# Patient Record
Sex: FEMALE | Race: WHITE | NOT HISPANIC OR LATINO | Employment: OTHER | URBAN - METROPOLITAN AREA
[De-identification: names, ages, dates, MRNs, and addresses within clinical notes are randomized per-mention and may not be internally consistent; named-entity substitution may affect disease eponyms.]

---

## 2017-04-12 ENCOUNTER — OFFICE VISIT - RIVER FALLS (OUTPATIENT)
Dept: FAMILY MEDICINE | Facility: CLINIC | Age: 62
End: 2017-04-12

## 2017-04-12 ASSESSMENT — MIFFLIN-ST. JEOR: SCORE: 1054.78

## 2017-11-14 ENCOUNTER — OFFICE VISIT - RIVER FALLS (OUTPATIENT)
Dept: FAMILY MEDICINE | Facility: CLINIC | Age: 62
End: 2017-11-14

## 2017-11-14 ASSESSMENT — MIFFLIN-ST. JEOR: SCORE: 1091.07

## 2017-11-18 ENCOUNTER — AMBULATORY - RIVER FALLS (OUTPATIENT)
Dept: FAMILY MEDICINE | Facility: CLINIC | Age: 62
End: 2017-11-18

## 2017-11-19 LAB
CHOLEST SERPL-MCNC: 232 MG/DL
CHOLEST/HDLC SERPL: 3.9 {RATIO}
CREAT SERPL-MCNC: 0.87 MG/DL (ref 0.5–0.99)
GLUCOSE BLD-MCNC: 87 MG/DL (ref 65–99)
HDLC SERPL-MCNC: 59 MG/DL
LDLC SERPL CALC-MCNC: 154 MG/DL
NONHDLC SERPL-MCNC: 173 MG/DL
TRIGL SERPL-MCNC: 84 MG/DL

## 2017-11-21 ENCOUNTER — OFFICE VISIT - RIVER FALLS (OUTPATIENT)
Dept: FAMILY MEDICINE | Facility: CLINIC | Age: 62
End: 2017-11-21

## 2017-11-21 ENCOUNTER — AMBULATORY - RIVER FALLS (OUTPATIENT)
Dept: FAMILY MEDICINE | Facility: CLINIC | Age: 62
End: 2017-11-21

## 2017-11-21 ASSESSMENT — MIFFLIN-ST. JEOR: SCORE: 1085.63

## 2018-11-16 ENCOUNTER — OFFICE VISIT - RIVER FALLS (OUTPATIENT)
Dept: FAMILY MEDICINE | Facility: CLINIC | Age: 63
End: 2018-11-16

## 2018-11-16 ASSESSMENT — MIFFLIN-ST. JEOR: SCORE: 1054.79

## 2018-11-20 LAB
CHOLEST SERPL-MCNC: 262 MG/DL
CHOLEST/HDLC SERPL: 3.6 {RATIO}
CREAT SERPL-MCNC: 0.73 MG/DL (ref 0.5–0.99)
GLUCOSE BLD-MCNC: 85 MG/DL (ref 65–99)
HBV SURFACE AB SERPL IA-ACNC: NORMAL M[IU]/ML
HDLC SERPL-MCNC: 73 MG/DL
LDLC SERPL CALC-MCNC: 170 MG/DL
NONHDLC SERPL-MCNC: 189 MG/DL
TRIGL SERPL-MCNC: 83 MG/DL

## 2018-11-27 ENCOUNTER — OFFICE VISIT - RIVER FALLS (OUTPATIENT)
Dept: FAMILY MEDICINE | Facility: CLINIC | Age: 63
End: 2018-11-27

## 2018-12-11 ENCOUNTER — OFFICE VISIT - RIVER FALLS (OUTPATIENT)
Dept: FAMILY MEDICINE | Facility: CLINIC | Age: 63
End: 2018-12-11

## 2018-12-18 ENCOUNTER — OFFICE VISIT - RIVER FALLS (OUTPATIENT)
Dept: FAMILY MEDICINE | Facility: CLINIC | Age: 63
End: 2018-12-18

## 2019-01-03 ENCOUNTER — AMBULATORY - RIVER FALLS (OUTPATIENT)
Dept: FAMILY MEDICINE | Facility: CLINIC | Age: 64
End: 2019-01-03

## 2019-01-24 ENCOUNTER — AMBULATORY - RIVER FALLS (OUTPATIENT)
Dept: FAMILY MEDICINE | Facility: CLINIC | Age: 64
End: 2019-01-24

## 2019-01-25 LAB
BUN SERPL-MCNC: 22 MG/DL (ref 7–25)
BUN/CREAT RATIO - HISTORICAL: NORMAL (ref 6–22)
CALCIUM SERPL-MCNC: 9.8 MG/DL (ref 8.6–10.4)
CHLORIDE BLD-SCNC: 103 MMOL/L (ref 98–110)
CO2 SERPL-SCNC: 25 MMOL/L (ref 20–32)
CREAT SERPL-MCNC: 0.98 MG/DL (ref 0.5–0.99)
EGFRCR SERPLBLD CKD-EPI 2021: 61 ML/MIN/1.73M2
GLUCOSE BLD-MCNC: 84 MG/DL (ref 65–99)
POTASSIUM BLD-SCNC: 4.2 MMOL/L (ref 3.5–5.3)
SODIUM SERPL-SCNC: 137 MMOL/L (ref 135–146)

## 2019-12-10 ENCOUNTER — AMBULATORY - RIVER FALLS (OUTPATIENT)
Dept: FAMILY MEDICINE | Facility: CLINIC | Age: 64
End: 2019-12-10

## 2019-12-11 LAB
BUN SERPL-MCNC: 25 MG/DL (ref 7–25)
BUN/CREAT RATIO - HISTORICAL: NORMAL (ref 6–22)
CALCIUM SERPL-MCNC: 10 MG/DL (ref 8.6–10.4)
CHLORIDE BLD-SCNC: 106 MMOL/L (ref 98–110)
CHOLEST SERPL-MCNC: 240 MG/DL
CHOLEST/HDLC SERPL: 4.1 {RATIO}
CO2 SERPL-SCNC: 26 MMOL/L (ref 20–32)
CREAT SERPL-MCNC: 0.81 MG/DL (ref 0.5–0.99)
EGFRCR SERPLBLD CKD-EPI 2021: 76 ML/MIN/1.73M2
GLUCOSE BLD-MCNC: 98 MG/DL (ref 65–99)
HBA1C MFR BLD: 5.7 %
HDLC SERPL-MCNC: 59 MG/DL
LDLC SERPL CALC-MCNC: 164 MG/DL
NONHDLC SERPL-MCNC: 181 MG/DL
POTASSIUM BLD-SCNC: 4.3 MMOL/L (ref 3.5–5.3)
SODIUM SERPL-SCNC: 139 MMOL/L (ref 135–146)
TRIGL SERPL-MCNC: 71 MG/DL

## 2019-12-19 ENCOUNTER — OFFICE VISIT - RIVER FALLS (OUTPATIENT)
Dept: FAMILY MEDICINE | Facility: CLINIC | Age: 64
End: 2019-12-19

## 2019-12-19 ASSESSMENT — MIFFLIN-ST. JEOR: SCORE: 1069.52

## 2019-12-21 ENCOUNTER — NURSE TRIAGE (OUTPATIENT)
Dept: NURSING | Facility: CLINIC | Age: 64
End: 2019-12-21

## 2019-12-21 ENCOUNTER — OFFICE VISIT - RIVER FALLS (OUTPATIENT)
Dept: FAMILY MEDICINE | Facility: CLINIC | Age: 64
End: 2019-12-21

## 2019-12-21 NOTE — TELEPHONE ENCOUNTER
"\"My arm is really really sore.\" Patient reporting she received the pneumonia vaccine 2 days ago in her right arm. Patient reporting increased tenderness today. Right arm is \"swollen and red.\" Redness from above elbow to shoulder. Afebrile. Advised to be seen with in 24 hours. Caller verbalized understanding. Denies further questions.    Reny Hernandez RN  [facility]      Reason for Disposition    [1] Redness or red streak around the injection site AND [2] begins > 48 hours after shot AND [3] no fever  (Exception: red area < 1 inch or 2.5 cm wide)    Additional Information    Negative: [1] Difficulty with breathing or swallowing AND [2] starts within 2 hours after injection    Negative: Difficult to awaken or acting confused (e.g., disoriented, slurred speech)    Negative: Unresponsive, passed out, or very weak    Negative: Sounds like a life-threatening emergency to the triager    Negative: Fever > 104 F (40 C)    Negative: [1] Fever > 101 F (38.3 C) AND [2] age > 60    Negative: [1] Fever > 100.0 F (37.8 C) AND [2] bedridden (e.g., nursing home patient, CVA, chronic illness, recovering from surgery)    Negative: [1] Fever > 100.0 F (37.8 C) AND [2] diabetes mellitus or weak immune system (e.g., HIV positive, cancer chemo, splenectomy, chronic steroids)    Negative: [1] Measles vaccine rash (onset day 6-12) AND [2] purple or blood-colored    Negative: Sounds like a severe, unusual reaction to the triager    Negative: [1] Redness or red streak around the injection site AND [2] begins > 48 hours after shot AND [3] fever    Protocols used: IMMUNIZATION [id number]      "

## 2020-01-08 ENCOUNTER — OFFICE VISIT - RIVER FALLS (OUTPATIENT)
Dept: FAMILY MEDICINE | Facility: CLINIC | Age: 65
End: 2020-01-08

## 2020-11-17 ENCOUNTER — OFFICE VISIT - RIVER FALLS (OUTPATIENT)
Dept: FAMILY MEDICINE | Facility: CLINIC | Age: 65
End: 2020-11-17

## 2020-11-18 LAB
BUN SERPL-MCNC: 26 MG/DL (ref 7–25)
BUN/CREAT RATIO - HISTORICAL: 29 (ref 6–22)
CALCIUM SERPL-MCNC: 10.4 MG/DL (ref 8.6–10.4)
CHLORIDE BLD-SCNC: 104 MMOL/L (ref 98–110)
CHOLEST SERPL-MCNC: 264 MG/DL
CHOLEST/HDLC SERPL: 3.8 {RATIO}
CO2 SERPL-SCNC: 28 MMOL/L (ref 20–32)
CREAT SERPL-MCNC: 0.89 MG/DL (ref 0.5–0.99)
EGFRCR SERPLBLD CKD-EPI 2021: 67 ML/MIN/1.73M2
GLUCOSE BLD-MCNC: 99 MG/DL (ref 65–99)
HBA1C MFR BLD: 5.6 %
HDLC SERPL-MCNC: 69 MG/DL
LDLC SERPL CALC-MCNC: 174 MG/DL
NONHDLC SERPL-MCNC: 195 MG/DL
POTASSIUM BLD-SCNC: 5.1 MMOL/L (ref 3.5–5.3)
SODIUM SERPL-SCNC: 139 MMOL/L (ref 135–146)
TRIGL SERPL-MCNC: 92 MG/DL

## 2020-12-02 ENCOUNTER — COMMUNICATION - RIVER FALLS (OUTPATIENT)
Dept: FAMILY MEDICINE | Facility: CLINIC | Age: 65
End: 2020-12-02

## 2021-01-01 ENCOUNTER — AMBULATORY - RIVER FALLS (OUTPATIENT)
Dept: FAMILY MEDICINE | Facility: CLINIC | Age: 66
End: 2021-01-01

## 2021-02-10 ENCOUNTER — AMBULATORY - RIVER FALLS (OUTPATIENT)
Dept: FAMILY MEDICINE | Facility: CLINIC | Age: 66
End: 2021-02-10

## 2021-02-11 LAB — FECAL FAT, QUALITATIVE: NORMAL

## 2021-02-12 ENCOUNTER — COMMUNICATION - RIVER FALLS (OUTPATIENT)
Dept: FAMILY MEDICINE | Facility: CLINIC | Age: 66
End: 2021-02-12

## 2021-03-03 ENCOUNTER — AMBULATORY - RIVER FALLS (OUTPATIENT)
Dept: FAMILY MEDICINE | Facility: CLINIC | Age: 66
End: 2021-03-03

## 2021-03-04 LAB — FECAL FAT, QUALITATIVE: NORMAL

## 2021-03-25 ENCOUNTER — COMMUNICATION - RIVER FALLS (OUTPATIENT)
Dept: FAMILY MEDICINE | Facility: CLINIC | Age: 66
End: 2021-03-25

## 2021-11-30 ENCOUNTER — OFFICE VISIT - RIVER FALLS (OUTPATIENT)
Dept: FAMILY MEDICINE | Facility: CLINIC | Age: 66
End: 2021-11-30

## 2021-11-30 ENCOUNTER — TRANSFERRED RECORDS (OUTPATIENT)
Dept: MULTI SPECIALTY CLINIC | Facility: CLINIC | Age: 66
End: 2021-11-30

## 2021-11-30 ASSESSMENT — MIFFLIN-ST. JEOR: SCORE: 1056.26

## 2021-12-01 ENCOUNTER — COMMUNICATION - RIVER FALLS (OUTPATIENT)
Dept: FAMILY MEDICINE | Facility: CLINIC | Age: 66
End: 2021-12-01

## 2021-12-01 LAB
A/G RATIO - HISTORICAL: 1.8 (ref 1–2.5)
ALBUMIN SERPL-MCNC: 4.6 GM/DL (ref 3.6–5.1)
ALP SERPL-CCNC: 71 UNIT/L (ref 37–153)
ALT SERPL W P-5'-P-CCNC: 27 UNIT/L (ref 6–29)
AST SERPL W P-5'-P-CCNC: 21 UNIT/L (ref 10–35)
BILIRUB SERPL-MCNC: 0.4 MG/DL (ref 0.2–1.2)
BUN SERPL-MCNC: 25 MG/DL (ref 7–25)
BUN/CREAT RATIO - HISTORICAL: NORMAL (ref 6–22)
CALCIUM SERPL-MCNC: 10.1 MG/DL (ref 8.6–10.4)
CHLORIDE BLD-SCNC: 105 MMOL/L (ref 98–110)
CHOLEST SERPL-MCNC: 251 MG/DL
CHOLEST/HDLC SERPL: 3.4 {RATIO}
CO2 SERPL-SCNC: 27 MMOL/L (ref 20–32)
CREAT SERPL-MCNC: 0.77 MG/DL (ref 0.5–0.99)
EGFRCR SERPLBLD CKD-EPI 2021: 79 ML/MIN/1.73M2
GLOBULIN: 2.5 (ref 1.9–3.7)
GLUCOSE BLD-MCNC: 96 MG/DL (ref 65–99)
HBA1C MFR BLD: 5.8 %
HDLC SERPL-MCNC: 73 MG/DL
LDLC SERPL CALC-MCNC: 162 MG/DL
NONHDLC SERPL-MCNC: 178 MG/DL
POTASSIUM BLD-SCNC: 4.6 MMOL/L (ref 3.5–5.3)
PROT SERPL-MCNC: 7.1 GM/DL (ref 6.1–8.1)
SODIUM SERPL-SCNC: 139 MMOL/L (ref 135–146)
TRIGL SERPL-MCNC: 66 MG/DL

## 2021-12-20 ENCOUNTER — COMMUNICATION - RIVER FALLS (OUTPATIENT)
Dept: FAMILY MEDICINE | Facility: CLINIC | Age: 66
End: 2021-12-20

## 2022-02-10 ENCOUNTER — OFFICE VISIT - RIVER FALLS (OUTPATIENT)
Dept: FAMILY MEDICINE | Facility: CLINIC | Age: 67
End: 2022-02-10
Payer: COMMERCIAL

## 2022-02-11 VITALS
WEIGHT: 133 LBS | HEIGHT: 60 IN | OXYGEN SATURATION: 98 % | HEART RATE: 69 BPM | TEMPERATURE: 98.2 F | SYSTOLIC BLOOD PRESSURE: 136 MMHG | BODY MASS INDEX: 26.11 KG/M2 | DIASTOLIC BLOOD PRESSURE: 76 MMHG

## 2022-02-11 VITALS
SYSTOLIC BLOOD PRESSURE: 148 MMHG | WEIGHT: 141 LBS | HEART RATE: 86 BPM | HEIGHT: 60 IN | DIASTOLIC BLOOD PRESSURE: 77 MMHG | BODY MASS INDEX: 27.45 KG/M2 | WEIGHT: 139.8 LBS | SYSTOLIC BLOOD PRESSURE: 132 MMHG | HEIGHT: 60 IN | BODY MASS INDEX: 27.68 KG/M2 | DIASTOLIC BLOOD PRESSURE: 80 MMHG | HEART RATE: 80 BPM | TEMPERATURE: 97.5 F

## 2022-02-11 VITALS
SYSTOLIC BLOOD PRESSURE: 110 MMHG | HEART RATE: 74 BPM | TEMPERATURE: 97.9 F | DIASTOLIC BLOOD PRESSURE: 72 MMHG | WEIGHT: 139 LBS | BODY MASS INDEX: 27.6 KG/M2 | OXYGEN SATURATION: 98 %

## 2022-02-11 VITALS
TEMPERATURE: 97.4 F | DIASTOLIC BLOOD PRESSURE: 60 MMHG | TEMPERATURE: 97.9 F | HEIGHT: 60 IN | SYSTOLIC BLOOD PRESSURE: 110 MMHG | BODY MASS INDEX: 27.09 KG/M2 | HEIGHT: 60 IN | HEART RATE: 81 BPM | HEART RATE: 98 BPM | SYSTOLIC BLOOD PRESSURE: 108 MMHG | WEIGHT: 138 LBS | DIASTOLIC BLOOD PRESSURE: 60 MMHG | SYSTOLIC BLOOD PRESSURE: 102 MMHG | DIASTOLIC BLOOD PRESSURE: 64 MMHG | HEART RATE: 74 BPM | WEIGHT: 138.8 LBS | OXYGEN SATURATION: 99 % | TEMPERATURE: 97 F | OXYGEN SATURATION: 99 % | OXYGEN SATURATION: 98 % | BODY MASS INDEX: 27.57 KG/M2 | BODY MASS INDEX: 27.41 KG/M2

## 2022-02-11 VITALS
BODY MASS INDEX: 29.39 KG/M2 | OXYGEN SATURATION: 98 % | HEART RATE: 86 BPM | TEMPERATURE: 97.3 F | SYSTOLIC BLOOD PRESSURE: 152 MMHG | DIASTOLIC BLOOD PRESSURE: 96 MMHG | HEIGHT: 58 IN | WEIGHT: 140 LBS

## 2022-02-11 VITALS
HEART RATE: 90 BPM | DIASTOLIC BLOOD PRESSURE: 70 MMHG | BODY MASS INDEX: 27.46 KG/M2 | WEIGHT: 140.6 LBS | OXYGEN SATURATION: 99 % | DIASTOLIC BLOOD PRESSURE: 66 MMHG | SYSTOLIC BLOOD PRESSURE: 140 MMHG | TEMPERATURE: 97.9 F | SYSTOLIC BLOOD PRESSURE: 110 MMHG | HEART RATE: 76 BPM

## 2022-02-11 VITALS
BODY MASS INDEX: 26.35 KG/M2 | OXYGEN SATURATION: 98 % | RESPIRATION RATE: 16 BRPM | SYSTOLIC BLOOD PRESSURE: 104 MMHG | HEIGHT: 60 IN | WEIGHT: 134.2 LBS | HEART RATE: 68 BPM | DIASTOLIC BLOOD PRESSURE: 60 MMHG

## 2022-02-16 NOTE — NURSING NOTE
Hearing Screening Entered On:  11/17/2020 11:14 AM CST    Performed On:  11/17/2020 11:13 AM CST by Tiffany Vasquez CMA               Additional Hearing Screen   Hearing Screen Comments :   Bilateral pass @ 4000Hz   Tiffany Vasquez CMA - 11/17/2020 11:13 AM CST

## 2022-02-16 NOTE — TELEPHONE ENCOUNTER
** Not Approved: Request responded to by other means, Sent to a mail order pharmacy **  citalopram (CITALOPRAM HBR 20 MG TABLET)  TAKE 1 TABLET BY MOUTH EVERY DAY  Qty:  30 tab(s)        Days Supply:  30        Refills:  0          Substitutions Allowed     [id number]   Signed by Rosie Robles CMA            ------------------------------------------  From: [place] [id number] [facility]  To: Jocelyn Lebron MD  Sent: May 10, 2019 3:38:01 AM CDT  Subject: Medication Management  Due: May 11, 2019 3:38:01 AM CDT    ** On Hold Pending Signature **  Drug: citalopram (citalopram 20 mg oral tablet)  TAKE 1 TABLET BY MOUTH EVERY DAY  Quantity: 30 tab(s)     Days Supply: 30        Refills: 0  Substitutions Allowed  Notes from Pharmacy:     Dispensed Drug: citalopram (citalopram 20 mg oral tablet)  TAKE 1 TABLET BY MOUTH EVERY DAY  Quantity: 30 tab(s)     Days Supply: 30        Refills: 0  Substitutions Allowed  Notes from Pharmacy:     ** On Hold Pending Signature **  Drug: citalopram (citalopram 20 mg oral tablet)  TAKE 1 TABLET BY MOUTH EVERY DAY  Quantity: 30 tab(s)     Days Supply: 30        Refills: 0  Substitutions Allowed  Notes from Pharmacy:     Dispensed Drug: citalopram (citalopram 20 mg oral tablet)  TAKE 1 TABLET BY MOUTH EVERY DAY  Quantity: 30 tab(s)     Days Supply: 30        Refills: 0  Substitutions Allowed  Notes from Pharmacy:   ------------------------------------------

## 2022-02-16 NOTE — PROGRESS NOTES
Chief Complaint    Patient is here for reaction to pneumonia vaccine. Has shot on Thursday. c/o tenderness, swelling, redness in right arm.  History of Present Illness      Patient presents to the clinic with a 48-hour history of increased pain redness and swelling at the site of her pneumococcal 23 vaccine on the right upper extremity.  She denies any fevers or chills.  She has had mild pain but the pain is improving as the redness is worsening.  Review of Systems      Review of systems is negative with the exception of those noted in HPI          Physical Exam   Vitals & Measurements    T: 97.9   F (Tympanic)  HR: 98(Peripheral)  BP: 108/64  SpO2: 98%     HT: 59.5 in       Examination of the right upper extremity reveals erythema slight induration and mild tenderness to palpation of the right deltoid muscle.  Peripheral pulses are intact cap refill is brisk.  Assessment/Plan       1. Local reaction to immunization (T88.1XXA)         conservative measures, cool compresses, iburpofen or tylenol relative rest.  She will not likely need another pneumonia vaccine but discussed this is not a true allergy but a possible expected reaction  FU for persistent or worsening sx.  Not likely infection as sx started in the first 48 hours of the immunization.  Patient Information     Name:TERRY FORRESTER      Address:      [address] [id number]     Sex:Female     YOB: 1955     [phone]     Emergency Contact:LASHAE FORRESTER     MRN:[id number]     FIN:[id number]     [email or web address]     Date of Service:12/21/2019      Primary Care Physician:       Jocelyn Lebron MD, [phone]      Attending Physician:       Meghna Chin PA-C, [phone]  Problem List/Past Medical History    Ongoing     Diverticulosis     Ex-smoker     Hyperlipidemia     Menopause     Osteopenia     Third degree hemorrhoids    Historical     *Hospitalized@[facility]  prolapse     AA (alcohol abuse)     Glaucoma     Lyme disease     Pregnancy     Pregnancy     Pregnancy     Pregnancy  Procedure/Surgical History     Mesh rectopexy (2016)            Comments: Rigid proctoscopy..     THD - Transanal hemorroidal dearterialization (2016)           Cardiac computed tomography for calcium scoring (2015)            Comments: Total Agatston calcium score is 5.     Colonoscopy (2011)            Comments: Normal; repeat 2021.     Sigmoidoscopy (2005)           Laser surgery ()            Comments: on R eye for Glaucoma.     Therapeutic  ()            section ()        Medications    Aleve 220 mg oral tablet, 220 mg= 1 tab(s), Oral, daily    Calcium 600+D, 1 tab(s), Oral, daily    citalopram 20 mg oral tablet, 20 mg= 1 tab(s), Oral, daily, 2 refills    lisinopril 10 mg oral tablet, See Instructions, 11 refills    lisinopril 10 mg oral tablet, 10 mg= 1 tab(s), Oral, daily, 1 refills    loratadine, Oral, prn    MiraLax oral powder for reconstitution, 17 gm, Oral, daily    triamcinolone 0.1% topical cream, 1 kurtis, Topical, bid, 2 refills  Allergies    Pneumovax 23 (Localized swelling)  Social History    Smoking Status - 2019     Never smoker     Alcohol      Past, Recovering alcoholic, 2017     Employment/School      Retired, 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 Highest education level: University degree(s)., 2018     Exercise     Home/Environment      Marital status: . Spouse/Partner name: Desi Barajas children. Risks in environment: owns secured gun., 2017     Nutrition/Health      Type of diet: Regular., 10/04/2016     Sexual      Sexually active: Yes. Sexual orientation: Heterosexual., 2012     Substance Abuse      Past, Marijuana, 2018     Tobacco      Former smoker, quit more than 30 days ago, Cigarettes, 2018  Family History    Allergic Rhinitis: Mother.     Arthritis: Mother.    Breast cancer: Mother (Dx at 75) and Cousin (M).    Crohn disease: Brother.    Depression: Mother.    Hypercholesterolemia: Mother.    Prostate cancer.: Father.    Sister: History is negative    Daughter: History is negative    Son: History is negative    Son: History is negative  Immunizations      Vaccine Date Status          pneumococcal (PPSV23) 12/19/2019 Given          influenza virus vaccine, inactivated 12/19/2019 Given          pneumococcal (PCV13) 11/16/2018 Given          influenza virus vaccine, inactivated 11/16/2018 Given          influenza 11/16/2018 Recorded          influenza virus vaccine, inactivated 11/14/2017 Given          influenza virus vaccine, inactivated 10/04/2016 Given          influenza virus vaccine, inactivated 08/31/2015 Given          tetanus/diphth/pertuss (Tdap) adult/adol 08/18/2014 Given          influenza virus vaccine, inactivated 12/01/2010 Given          influenza, H1N1, inactivated 12/15/2009 Recorded          Td 04/04/2003 Recorded          Td 03/10/1993 Recorded          Td 04/01/1981 Recorded  Lab Results       Lab Results (Last 4 results within 90 days)        Sodium Level: 139 mmol/L [135 mmol/L - 146 mmol/L] (12/10/19 09:33:00)       Potassium Level: 4.3 mmol/L [3.5 mmol/L - 5.3 mmol/L] (12/10/19 09:33:00)       Chloride Level: 106 mmol/L [98 mmol/L - 110 mmol/L] (12/10/19 09:33:00)       CO2 Level: 26 mmol/L [20 mmol/L - 32 mmol/L] (12/10/19 09:33:00)       Glucose Level: 98 mg/dL [65 mg/dL - 99 mg/dL] (12/10/19 09:33:00)       BUN: 25 mg/dL [7 mg/dL - 25 mg/dL] (12/10/19 09:33:00)       Creatinine Level: 0.81 mg/dL [0.5 mg/dL - 0.99 mg/dL] (12/10/19 09:33:00)       BUN/Creat Ratio: NOT APPLICABLE [6  - 22] (12/10/19 09:33:00)       eGFR: 76 mL/min/1.73m2 (12/10/19 09:33:00)       eGFR : 88 mL/min/1.73m2 (12/10/19 09:33:00)       Calcium Level: 10 mg/dL [8.6 mg/dL - 10.4 mg/dL] (12/10/19 09:33:00)       Hgb A1c: 5.7 High  (12/10/19 09:33:00)       Cholesterol: 240 mg/dL High (12/10/19 09:33:00)       Non-HDL Cholesterol: 181 High (12/10/19 09:33:00)       HDL: 59 mg/dL (12/10/19 09:33:00)       Cholesterol/HDL Ratio: 4.1 (12/10/19 09:33:00)       LDL: 164 High (12/10/19 09:33:00)       Triglyceride: 71 mg/dL (12/10/19 09:33:00)

## 2022-02-16 NOTE — TELEPHONE ENCOUNTER
---------------------  From: Jocelyn Lebron MD   To: [facility] Message Pool ([id number]_[place]);     Sent: 11/30/2021 10:07:11 AM CST  Subject: fax     [facility]xa [facility] plsFaxed to [facility] [phone]

## 2022-02-16 NOTE — TELEPHONE ENCOUNTER
---------------------  From: Tory Veras LPN (eRx Pool ([address]_[facility]))   To: Advanced Practice Provider Pool ([address] [place] [address]);     Sent: 5/15/2020 8:22:48 AM CDT  Subject: FW: Medication Management   Due Date/Time: 5/15/2020 10:14:00 AM CDT     MJP out of clinic  Medication Refill needing approval    PCP:   ARIADNA    Medication:   pimecrolimus 1% topical cream  Last Filled:  1/8/20    Quantity:  30gm  Refills:  0  CSA on file?   n/a     Date of last office visit and reason:   1/8/20 with MJP  Date of last labs pertaining to condition:  n/a    Note:  Please advise on refill.     Return to Clinic order placed?  yes- pt due for AWV in Dec 2020    Resource:   pharmacy        ------------------------------------------  [id number]  To: Jocelyn Lebron MD  Sent: May 14, 2020 10:14:37 AM CDT  Subject: Medication Management  Due: May 15, 2020 10:14:37 AM CDT    ** On Hold Pending Signature **  Drug: pimecrolimus topical (pimecrolimus 1% topical cream)  APPLY TO AFFECTED AREA TWICE A DAY  Quantity: 30 gm  Days Supply: 30  Refills: 0  Substitutions Allowed  Notes from Pharmacy:     Dispensed Drug: pimecrolimus topical (pimecrolimus 1% topical cream)  APPLY TO AFFECTED AREA TWICE A DAY  Quantity: 30 gm  Days Supply: 30  Refills: 0  Substitutions Allowed  Notes from Pharmacy:   ---------------------------------------------------------------  From: Jud Dallas (Advanced Practice Provider Pool ([address]_[facility]))   To: eRx Pool ([address]_[place]);     Sent: 5/15/2020 8:35:40 AM CDT  Subject: RE: Medication Management     I will send refill---------------------  From: Jud Dallas   To: [facility] [id number] [facility]    Sent: 5/15/2020 8:35:49 AM CDT  Subject: FW: Medication Management     ** Submitted: **  Complete:pimecrolimus topical (pimecrolimus 1% topical cream)   Signed by Jud Dallas  5/15/2020 1:35:00 PM    ** Approved **  pimecrolimus topical (PIMECROLIMUS 1% CREAM)   APPLY TO AFFECTED AREA TWICE A DAY  Qty:  30 gm        Days Supply:  30        Refills:  0          Substitutions Allowed     [id number]   Signed by Jud Dallas

## 2022-02-16 NOTE — LETTER
(Inserted Image. Unable to display)   December 30, 2021  TERRY FORRESTER   [address] [place] [address]        Dear TERRY,    Thank you for selecting [facility] for your healthcare needs.    Our records indicate you are due for the following services:     Colon Cancer Screening Stool Cards ~ Stool cards were sent home with you within the past 3 months and have not been returned to us for testing. You may either drop off your stool cards at your clinic, or send them to us in the mail.     (FYI   Regarding office visits: In some instances, a video visit or telephone visit may be offered as an option.)    To schedule an appointment or if you have further questions, please contact your clinic at [phone].    Powered by [email or web address]    Sincerely,    Jocelyn Lebron MD

## 2022-02-16 NOTE — TELEPHONE ENCOUNTER
---------------------  From: Kristi SHELTON, Kareen (Phone Messages Pool ([address]_[facility]))   To: Phone Messages Pool ([address]_[place]);     Sent: 10/29/2020 10:09:19 AM CDT  Subject: Citalopram refill     Phone Message    PCP:   ARIADNA      Time of Call:  0935       Person Calling:  Lolita from [facility]  Phone number:  [phone]    Reason for call:  [facility] asking for refill of citalopram 20mg  Returned call at: _    Note:   called/LMTCB as med was refilled 10/21/2020 and sent to [facility] [place].  Has AWV 11/4/2020 w/ [facility].  Need to verify what pharmacy med is to go to.    Last office visit and reason:  _    Transferred to: _Per patient request in previous message, she wanted this sent to [facility] lvm verifying she does not need med sent to [facility]. Appt made for refills

## 2022-02-16 NOTE — NURSING NOTE
Medicare Visit Entered On:  12/19/2019 9:13 AM CST    Performed On:  12/19/2019 9:05 AM CST by Maryanne Lima CMA               Summary   Chief Complaint :   AWV   Menstrual Status :   Postmenopausal   Weight Measured :   138.0 lb(Converted to: 138 lb 0 oz, 62.60 kg)    Height Measured :   59.5 in(Converted to: 4 ft 11 in, 151.13 cm)    Body Mass Index :   27.4 kg/m2 (HI)    Body Surface Area :   1.62 m2   Systolic Blood Pressure :   110 mmHg   Diastolic Blood Pressure :   60 mmHg   Mean Arterial Pressure :   77 mmHg   Peripheral Pulse Rate :   74 bpm   BP Site :   Right arm   BP Method :   Manual   HR Method :   Electronic   Temperature Tympanic :   97.4 DegF(Converted to: 36.3 DegC)  (LOW)    Oxygen Saturation :   99 %   Maryanne Lima CMA - 12/19/2019 9:05 AM CST   Health Status   Allergies Verified? :   Yes   Medication History Verified? :   Yes   Pre-Visit Planning Status :   Completed   Tobacco Use? :   Never smoker   Maryanne Lima CMA - 12/19/2019 9:05 AM CST   Consents   Consent for Immunization Exchange :   Consent Granted   Consent for Immunizations to Providers :   Consent Granted   Maryanne Lima CMA - 12/19/2019 9:05 AM CST   Meds / Allergies   (As Of: 12/19/2019 9:13:23 AM CST)   Allergies (Active)   No Known Medication Allergies  Estimated Onset Date:   Unspecified ; Created By:   Jose Elias Garcia CMA; Reaction Status:   Active ; Category:   Drug ; Substance:   No Known Medication Allergies ; Type:   Allergy ; Updated By:   Jose Elias Garcia CMA; Reviewed Date:   11/21/2017 8:42 AM CST        Medication List   (As Of: 12/19/2019 9:13:23 AM CST)   Prescription/Discharge Order    citalopram  :   citalopram ; Status:   Prescribed ; Ordered As Mnemonic:   citalopram 20 mg oral tablet ; Simple Display Line:   20 mg, 1 tab(s), PO, Daily, 90 tab(s), 2 Refill(s) ; Ordering Provider:   Jocelyn Lebron MD; Catalog Code:   citalopram ; Order Dt/Tm:   3/11/2019 3:33:14 PM CDT          lisinopril 10 mg oral tablet  :    lisinopril 10 mg oral tablet ; Status:   Prescribed ; Ordered As Mnemonic:   lisinopril 10 mg oral tablet ; Simple Display Line:   See Instructions, TAKE 1 TABLET BY MOUTH EVERY DAY, 30 tab(s), 11 Refill(s) ; Ordering Provider:   Jocelyn Lebron MD; Catalog Code:   lisinopril ; Order Dt/Tm:   2/12/2019 7:03:09 PM CST          lisinopril  :   lisinopril ; Status:   Prescribed ; Ordered As Mnemonic:   lisinopril 10 mg oral tablet ; Simple Display Line:   10 mg, 1 tab(s), PO, Daily, 30 tab(s), 1 Refill(s) ; Ordering Provider:   Jocelyn Lebron MD; Catalog Code:   lisinopril ; Order Dt/Tm:   12/18/2018 11:23:52 AM CST          triamcinolone topical  :   triamcinolone topical ; Status:   Prescribed ; Ordered As Mnemonic:   triamcinolone 0.1% topical cream ; Simple Display Line:   1 kurtis, TOP, BID, 30 g, 2 Refill(s) ; Ordering Provider:   Jocelyn Lebron MD; Catalog Code:   triamcinolone topical ; Order Dt/Tm:   11/16/2018 10:04:16 AM CST            Home Meds    calcium-vitamin D  :   calcium-vitamin D ; Status:   Documented ; Ordered As Mnemonic:   Calcium 600+D ; Simple Display Line:   1 tab(s), po, daily ; Catalog Code:   calcium-vitamin D ; Order Dt/Tm:   4/26/2013 8:58:20 AM CDT          loratadine  :   loratadine ; Status:   Documented ; Ordered As Mnemonic:   loratadine ; Simple Display Line:   po, prn, 0 Refill(s) ; Catalog Code:   loratadine ; Order Dt/Tm:   4/12/2017 11:25:47 AM CDT          naproxen  :   naproxen ; Status:   Documented ; Ordered As Mnemonic:   Aleve 220 mg oral tablet ; Simple Display Line:   220 mg, 1 tab(s), po, daily ; Catalog Code:   naproxen ; Order Dt/Tm:   4/26/2013 8:58:44 AM CDT          polyethylene glycol 3350  :   polyethylene glycol 3350 ; Status:   Documented ; Ordered As Mnemonic:   MiraLax oral powder for reconstitution ; Simple Display Line:   17 gm, po, daily, 0 Refill(s) ; Catalog Code:   polyethylene glycol 3350 ; Order Dt/Tm:   10/4/2016 2:51:12 PM CDT             Geriatric Depression Screening   Geriatric Depression Satisfied Life :   Yes   Geriatric Depression Dropped Activities :   No   Geriatric Depression Life Empty :   No   Geriatric Depression Bored :   No   Geriatric Depression Good Spirits :   Yes   Geriatric Depression Afraid Bad Things :   No   Geriatric Depression Feel Happy :   Yes   Geriatric Depression Feel Helpless :   No   Geriatric Depression Prefer to Stay Home :   No   Geriatric Depression Memory Problems :   No   Geriatric Depression Wonderful Be Alive :   Yes   Geriatric Depression Feel Worthless :   No   Geriatric Depression Situation Hopeless :   No   Geriatric Depression Others Better Off :   No   Geriatric Depression Full of Energy :   No   Geriatric Depression Total Score :   1    Maryanne Lima CMA - 12/19/2019 9:05 AM CST   Hearing and Vision Screening   Audiogram Result Right Ear :   Fail   Audiogram Result Left Ear :   Fail   Maryanne Lima CMA - 12/19/2019 9:05 AM CST   Home Safety Screen   Emergency Numbers Kept/Updated :   No   Aware of Smoking Dangers :   Yes   Smoke Alarms/Fire Extinguisher Available :   Yes   Household Members Fire Safety Knowledge :   No   Firearms Unloaded and Secure :   Yes   Floor Rugs Removed or Fastened :   No   Mats in Bathtub/Shower :   No   Stairway Rails or Banisters :   Yes   Outdoor Clutter Safety :   Yes   Indoor Clutter Safety :   Yes   Electrical Cord Safety :   Yes   Maryanne Lima CMA 12/19/2019 9:05 AM CST   Bhatti Fall Risk   History of Fall in Last 3 Months Bhatti :   No   Maryanne Lima CMA - 12/19/2019 9:05 AM CST   Functional Assessment   Focused Functional Assessment Grid   Bathing :   Independent (2)   Dressing :   Independent (2)   Toileting :   Independent (2)   Transferring Bed or Chair :   Independent (2)   Continence :   Independent (2)   Feeding :   Independent (2)   Maryanne Lima CMA 12/19/2019 9:05 AM CST   ADL Index Score :   12    Capable of Shopping :   Yes   Capable of Walking :   Yes    Capable of Housekeeping :   Yes   Capable of Managing Medications :   Yes   Capable of Handling Finances :   Yes   Maryanne Lima CMA - 12/19/2019 9:05 AM CST

## 2022-02-16 NOTE — PROGRESS NOTES
Patient:   TERRY FORRESTER            MRN: [id number]            FIN: [id number]               Age:   64 years     Sex:  Female     :  1955   Associated Diagnoses:   Medicare annual wellness visit, initial   Author:   Jocelyn Lebron MD      Chief Complaint   2019 11:17 AM CST  Patient is here for reaction to pneumonia vaccine. Has shot on Thursday. c/o tenderness, swelling, redness in right arm.        History of Present Illness             The patient presents for Medicare annual wellness exam.  The patient's general health status is described as unchanged and stable.  The patient's diet is described as balanced.  Exercise Had preaviously been doing body pump which was helping her feel very well. This has stopped after her daughter had a baby that patient takes care of during the day. Does take her dog out for walks..  Associated symptoms consist of denies shortness of breath and denies weight loss.       Takes care of grandson during the day. He was born premature and is now 1 years old. He is developmentally behind but makes good eye contact and interacts with patient.       Skin: Has red patches on her chin that are very itchy. Has been using triamcinolone cream on the spots which stops the itching. This started     Cold: Started Tuesday. No difficulty breathing. No change when out in the weather.       Former smoker. Quit >15 years ago.      Tobacco cessation- Former smoker. Quit >15 year ago     last vision screen- Patient goes yearly     Falls in the past 3 months?     Home Safety Screen:     Functional Assessment: Independent with bathing, Dressing, Toileting, transferring from bed or chair, feeding,                                           Capable of shopping, walking, housekeeping, managing meds, and handling finances     Geriatric depression screen: neg     Cage: neg     I, Darline Lozano LPN, acted solely as a scribe for, and in the presence of Dr. Jocelyn Lebron who performed the  services.         Review of Systems   Ear/Nose/Mouth/Throat:  Hearing is evaluated.    See completed Health History for Review of Systems   Psychiatric:  GDS noted.       Health Status   Allergies:    Allergic Reactions (Selected)  No Known Medication Allergies   Medications:  (Selected)   Prescriptions  Prescribed  citalopram 20 mg oral tablet: = 1 tab(s) ( 20 mg ), PO, Daily, # 90 tab(s), 2 Refill(s), Type: Maintenance, Pharmacy: [facility], 1 tab(s) Oral daily  lisinopril 10 mg oral tablet: = 1 tab(s) ( 10 mg ), PO, Daily, # 30 tab(s), 1 Refill(s), Type: Maintenance, Pharmacy: [place] [id number] [facility], 1 tab(s) Oral daily  lisinopril 10 mg oral tablet: See Instructions, Instructions: TAKE 1 TABLET BY MOUTH EVERY DAY, # 30 tab(s), 11 Refill(s), Type: Soft Stop, Pharmacy: [facility] [id number] [facility], TAKE 1 TABLET BY MOUTH EVERY DAY  triamcinolone 0.1% topical cream: 1 kurtis, TOP, BID, # 30 g, 2 Refill(s), Type: Maintenance, Pharmacy: [facility], 1 kurtis Topical bid  Documented Medications  Documented  Aleve 220 mg oral tablet: 1 tab(s) ( 220 mg ), po, daily, 0 Refill(s), Type: Maintenance  Calcium 600+D: 1 tab(s), po, daily, 0 Refill(s), Type: Maintenance  MiraLax oral powder for reconstitution: ( 17 gm ), po, daily, 0 Refill(s), Type: Maintenance  loratadine: po, prn, 0 Refill(s), Type: Maintenance   Problem list:    All Problems  Menopause / SNOMED CT 980868588 / Confirmed  Ex-smoker / SNOMED CT 83415402 / Confirmed  Diverticulosis / SNOMED CT 7856606575 / Confirmed  Third degree hemorrhoids / SNOMED CT 749433549 / Confirmed  Osteopenia / SNOMED CT 528024293 / Confirmed  Hyperlipidemia / SNOMED CT 76457327 / Confirmed   Medicare Assessments      Bhatti Fall Risk  (12/19/2019 09:05 am)       History of Fall in Last 3 Months Bhatti:  No     Functional Assessment  (12/19/2019 09:05 am)       Bathing ADL Index:  Independent (2)       Dressing ADL Index:  Independent (2)       Toileting  ADL Index:  Independent (2)       Transferring Bed or Chair ADL Index:  Independent (2)       Continence ADL Index:  Independent (2)       Feeding ADL Index:  Independent (2)       ADL Index Score:  12     Depression Screening  Not recorded for selected visit.    Detailed Depression Screening  Not recorded for selected visit.    Geriatric Depression Screen  (12/19/2019 09:05 am)       Geriatric Depression Satisfied Life:  Yes       Geriatric Depression Dropped Activities:  No       Geriatric Depression Life Empty:  No       Geriatric Depression Bored:  No       Geriatric Depression Good Spirits:  Yes       Geriatric Depression Afraid Bad Things:  No       Geriatric Depression Feel Happy:  Yes       Geriatric Depression Feel Helpless:  No       Geriatric Depression Prefer to Stay Home:  No       Geriatric Depression Memory Problems:  No       Geriatric Depression Wonderful Be Alive:  Yes       Geriatric Depression Feel Worthless:  No       Geriatric Depression Situation Hopeless:  No       Geriatric Depression Others Better Off:  No       Geriatric Depression Full of Energy:  No       Geriatric Depression Total Score:  1     Hearing Screen  (12/19/2019 09:05 am)       Ear, Right Audiogram:  Fail       Ear, Left Audiogram:  Fail     Home Safety Screen  (12/19/2019 09:05 am)       Emergency Numbers Kept/Updated:  No       Aware of Smoking Dangers:  Yes       Smoke Alarms/Fire Extinguisher Available:  Yes       Household Members Fire Safety Knowledge:  No       Firearms Unloaded and Secure:  Yes       Floor Rugs Removed or Fastened:  No       Mats in Bathtub/Shower:  No       Stairway Rails or Banisters:  Yes       Outdoor Clutter Safety:  Yes       Indoor Clutter Safety:  Yes       Electrical Cord Safety:  Yes     Vision Screen  Not recorded for selected visit.     ADL's and Safety reviewed with patient.      Histories   Past Medical History:    Active  Diverticulosis (6118589813): Onset on 1/6/2011 at 55  years.  Menopause (387228627): Onset in  at 52 years.  Ex-smoker (06611120)  Resolved  *[facility]ospitalized[facility] - Rectal prolapse: Onset on 2016 at 61 years.  Resolved on 2016 at 61 years.  Lyme disease (16531782): Onset in the month of 2009 at 54 years  Resolved.  AA (alcohol abuse) (30274604):  Resolved.  Glaucoma (70298926):  Resolved.  Pregnancy (568121838):  Resolved on 1982 at 27 years.  Pregnancy (018649003):  Resolved on 3/1/1984 at 28 years.  Pregnancy (086976031):  Resolved on 10/1/1987 at 32 years.  Pregnancy (336210746):  Resolved on 1990 at 34 years.   Family History:    Allergic Rhinitis  Mother  Breast cancer  Cousin (M)  Mother: onset at 75 .  Hypercholesterolemia  Mother  Depression  Mother  Arthritis  Mother  Crohn disease  Brother  Prostate cancer.  Father ()     Procedure history:    Mesh rectopexy (446109460) on 2016 at 61 Years.  Comments:  2016 8:17 AM CDT - Brinda Rhodes  Rigid proctoscopy.  THD - Transanal hemorroidal dearterialization on 2016 at 60 Years.  Cardiac computed tomography for calcium scoring (5019069072) on 2015 at 60 Years.  Comments:  10/23/2015 11:46 AM CDT - Ashtyn Cid  Total Agatston calcium score is 5  Colonoscopy (655505900) on 2011 at 55 Years.  Comments:  2011 11:05 AM [facility] - Abdifatah MONAHNA, Derek  Normal; repeat 2021  Sigmoidoscopy (00962895) on 2005 at 50 Years.  Laser surgery (16663673) in the month of 2000 at 44 Years.  Comments:  2010 9:18 AM Renetta Pena  on R eye for Glaucoma  Therapeutic  (23244770) in the month of 1994 at 38 Years.   section (86951909) in  at 27 Years.   Social History:        Alcohol Assessment            Past, Recovering alcoholic      Tobacco Assessment            Former smoker, quit more than 30 days ago, Cigarettes                     Comments:                      2018 - Chey García                     Quit in .       Substance Abuse Assessment            Past, Marijuana                     Comments:                      11/16/2018 - Chey García                     Quit in January, 1992.      Employment and Education Assessment            Retired, 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  Highest education level: University degree(s).      Home and Environment Assessment            Marital status: .  Spouse/Partner name: Mino.  Karl children.  Risks in environment: owns secured gun.      Nutrition and Health Assessment            Type of diet: Regular.      Exercise and Physical Activity Assessment                     Comments:                      11/16/2018 - Chey García                     There times per week.  Body pump.  Treadmill.  Weights.      Sexual Assessment            Sexually active: Yes.  Sexual orientation: Heterosexual.        Physical Examination   Vital Signs   12/19/2019 9:05 AM CST Temperature Tympanic 97.4 DegF  LOW    Peripheral Pulse Rate 74 bpm    HR Method Electronic    Systolic Blood Pressure 110 mmHg    Diastolic Blood Pressure 60 mmHg    Mean Arterial Pressure 77 mmHg    BP Site Right arm    BP Method Manual    Oxygen Saturation 99 %      General:  Alert and oriented, No acute distress, Dressed appropriate for weather, appropriate hygrine, bright affect and stable mood..    Eye:  Pupils are equal, round and reactive to light, Normal conjunctiva.    HENT:  Oral mucosa is moist, tm bilateral modeatley obstructed by cerumen, flushed by css with good results.    Neck:  Supple, Non-tender, No carotid bruit, No lymphadenopathy.    Respiratory:  Lungs are clear to auscultation, Respirations are non-labored, Breath sounds are equal, Symmetrical chest wall expansion.    Cardiovascular:  Normal rate, Regular rhythm, No murmur, No edema.    Gastrointestinal:  Soft, Non-tender, Non-distended, unchanged umbilical hernia..    Musculoskeletal:  Normal range of motion, Normal gait.    Integumentary:   Warm, No rash.    Neurologic:  Alert, Oriented, No focal deficits.    Cognition and Speech:  Oriented, Speech clear and coherent, Functional cognition intact.    Psychiatric:  Cooperative, Appropriate mood & affect, Normal judgment, Patient's GDS and CAGE questionnaire reviewed and discussed with patient. .       Review / Management   Differential diagnosis:  Right Ear Lavage Secondary to Cerumen Impaction.       Impression and Plan   Course:  Progressing as expected.    Plan:  Discussed:   Preventative services.      Appropriate weight and diet discussed.     Advance Life Directives.     Ways to increase exercise, and to Do weight bearing exercises. Get 120min /week of aerobic exercise    Prediabetes due to labs.    DEXA scans reviewed and discussed pre osteopenia prevention.    Using sunscreen, protecting from sunburn, regular self skin checks    Refer to [place], AHA and ADA for diet and exercise recommendations  consume 5635-4110 mg calcium daily    Can get mammo at hospital by calling up and scheduling, Will schedule for the beginning of 2020. Patient declines breast exam.     May try to get Shingrix from local pharmacy, she is a candidate but there is a national shortage  Prevnar 23 and influenza given today.      Up to date on colon cancer screening, Due in 2021     May use vegetable oil for vaginal lubrication if intercourse is painful or may consider R/B of HRT systemic or local    Hearing screen discussed today and recommended seeing our specialist in clinic.    Discussed When to RTC for umbilical hernia.    Continue Yearly Eye exams.    Advised to get non slip pads for tub and showers.     Medications refilled  .    Preventative services needed::  Preventative services checklist reviewed, updated and copy given to patient.  Please see scanned document.         HIV risk screening: low risk.    Patient Instructions:          Limitations: Limit caffeine intake, Limit alcohol consumption.          Counseled: Patient, Regarding treatment, Regarding medications, Diet, Activity, Verbalized understanding.    Diagnosis     Medicare annual wellness visit, initial (WWI75-KZ Z00.00).     Total time spent with patient and coordination of care:  _  minutes

## 2022-02-16 NOTE — PROGRESS NOTES
Patient:   TERRY FORRESTER            MRN: [id number]            FIN: [id number]               Age:   63 years     Sex:  Female     :  1955   Associated Diagnoses:   Medicare annual wellness visit, initial; Osteopenia; Need for hepatitis C screening test; Immunization due; Hypertension; Hyperlipidemia; Encounter for screening for HIV   Author:   Jocelyn Lebron MD      Visit Information      Care Providers  Not recorded for selected visit.  Ancillary Services  (2018 09:50 am)  [facility], Other: Dentist, Phone:  [phone], [place], [place]  [facility], Phone:  [phone], [place]        Current Visit Date:  2018          Chief Complaint      History of Present Illness             The patient presents for Medicare annual wellness exam.  The patient's general health status is described as unchanged and stable.  The patient's diet is described as balanced.  Exercise occasional.  Associated symptoms consist of denies shortness of breath and denies weight loss.        Review of Systems   Ear/Nose/Mouth/Throat:  Hearing is evaluated.    See completed Health History for Review of Systems   Psychiatric:  GDS noted.       Health Status   Allergies:    Allergic Reactions (Selected)  No Known Medication Allergies   Medications:  (Selected)   Prescriptions  Prescribed  citalopram 20 mg oral tablet: = 1 tab(s) ( 20 mg ), PO, Daily, # 90 tab(s), 3 Refill(s), Type: Maintenance, Pharmacy: [facility], Appointment scheduled, 1 tab(s) Oral daily  triamcinolone 0.1% topical cream: 1 kurtis, TOP, BID, # 30 g, 2 Refill(s), Type: Maintenance, Pharmacy: [facility], 1 kurtis Topical bid  Documented Medications  Documented  Aleve 220 mg oral tablet: 1 tab(s) ( 220 mg ), po, daily, 0 Refill(s), Type: Maintenance  Calcium 600+D: 1 tab(s), po, daily, 0 Refill(s), Type: Maintenance  MiraLax oral powder for reconstitution: ( 17 gm ), po, daily, 0  Refill(s), Type: Maintenance  loratadine: po, prn, 0 Refill(s), Type: Maintenance   Problem list:    All Problems  Diverticulosis / SNOMED CT 4460855192 / Confirmed  Ex-smoker / SNOMED CT 58208366 / Confirmed  Hyperlipidemia / SNOMED CT 75171835 / Confirmed  Menopause / SNOMED CT 208168392 / Confirmed  Osteopenia / SNOMED CT 536535997 / Confirmed  Third degree hemorrhoids / SNOMED CT 466275198 / Confirmed   Medicare Assessments      Bhatti Fall Risk  (11/16/2018 08:08 am)       History of Fall in Last 3 Months Bhatti:  Yes     Functional Assessment  (11/16/2018 08:08 am)       Bathing ADL Index:  Independent (2)       Dressing ADL Index:  Independent (2)       Toileting ADL Index:  Independent (2)       Transferring Bed or Chair ADL Index:  Independent (2)       Continence ADL Index:  Independent (2)       Feeding ADL Index:  Independent (2)       ADL Index Score:  12     Geriatric Depression Screen  (11/16/2018 08:08 am)       Geriatric Depression Satisfied Life:  Yes       Geriatric Depression Dropped Activities:  No       Geriatric Depression Life Empty:  No       Geriatric Depression Bored:  No       Geriatric Depression Good Spirits:  Yes       Geriatric Depression Afraid Bad Things:  No       Geriatric Depression Feel Happy:  Yes       Geriatric Depression Feel Helpless:  No       Geriatric Depression Prefer to Stay Home:  No       Geriatric Depression Memory Problems:  No       Geriatric Depression Wonderful Be Alive:  Yes       Geriatric Depression Feel Worthless:  No       Geriatric Depression Situation Hopeless:  No       Geriatric Depression Others Better Off:  No       Geriatric Depression Full of Energy:  Yes       Geriatric Depression Total Score:  0     Hearing Screen  (11/16/2018 10:10 am)       Ear, Right Audiogram:  Fail       Ear, Left Audiogram:  Fail       Hearing Screen Comments:  right failed 2000 and 1000left failed 1000     Home Safety Screen  (11/16/2018 08:08 am)       Emergency Numbers  Kept/Updated:  No       Aware of Smoking Dangers:  Yes       Smoke Alarms/Fire Extinguisher Available:  No       Firearms Unloaded and Secure:  Yes       Floor Rugs Removed or Fastened:  No       Mats in Bathtub/Shower:  No       Stairway Rails or Banisters:  Yes       Outdoor Clutter Safety:  Yes       Indoor Clutter Safety:  Yes       Electrical Cord Safety:  Yes     Vision Screen  Not recorded for selected visit.     ADL's and Safety reviewed with patient.      Histories   Past Medical History:    Active  Diverticulosis (8171441341): Onset on 2011 at 55 years.  Menopause (498106231): Onset in  at 52 years.  Ex-smoker (39596791)  Resolved  *Hospitalized[facility] - Rectal prolapse: Onset on 2016 at 61 years.  Resolved on 2016 at 61 years.  Lyme disease (02426337): Onset in the month of 2009 at 54 years  Resolved.  AA (alcohol abuse) (04643545):  Resolved.  Glaucoma (45947107):  Resolved.  Pregnancy (235128174):  Resolved on 1982 at 27 years.  Pregnancy (834692800):  Resolved on 3/1/1984 at 28 years.  Pregnancy (120755859):  Resolved on 10/1/1987 at 32 years.  Pregnancy (747978209):  Resolved on 1990 at 34 years.   Family History:    Allergic Rhinitis  Mother  Breast cancer  Cousin (M)  Mother: onset at 75 .  Hypercholesterolemia  Mother  Depression  Mother  Arthritis  Mother  Crohn disease  Brother  Prostate cancer.  Father ()     Procedure history:    Mesh rectopexy (193265250) on 2016 at 61 Years.  Comments:  2016 8:17 AM - Brinda Rhodes  Rigid proctoscopy.  THD - Transanal hemorroidal dearterialization on 2016 at 60 Years.  Cardiac computed tomography for calcium scoring (6936609056) on 2015 at 60 Years.  Comments:  10/23/2015 11:46 AM - Ashtyn Cid  Total Agatston calcium score is 5  Colonoscopy (198666601) on 2011 at 55 Years.  Comments:  2011 11:05 AM - Abdifatah MONAHAN, Derek  Normal; repeat 2021  Sigmoidoscopy (69468945) on 2005 at 50  Years.  Laser surgery (87897890) in the month of 2000 at 44 Years.  Comments:  2010 9:18 AM - Mikey Renetta  on R eye for Glaucoma  Therapeutic  (64090044) in the month of 1994 at 38 Years.   section (65971816) in  at 27 Years.   Social History:        Alcohol Assessment            Past, Recovering alcoholic      Tobacco Assessment            Former smoker, quit more than 30 days ago, Cigarettes                     Comments:                      2018 - Chey García                     Quit in .      Substance Abuse Assessment            Past, Marijuana                     Comments:                      2018 - Chey García                     Quit in .      Employment and Education Assessment            Retired, 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  Highest education level: University degree(s).      Home and Environment Assessment            Marital status: .  Spouse/Partner name: Mino.  3 children.  Risks in environment: owns secured gun.      Nutrition and Health Assessment            Type of diet: Regular.      Exercise and Physical Activity Assessment                     Comments:                      2018 - Chey García                     There times per week.  Body pump.  Treadmill.  Weights.      Sexual Assessment            Sexually active: Yes.  Sexual orientation: Heterosexual.        Physical Examination   VS/Measurements   General:  Alert and oriented, No acute distress.    Eye:  Pupils are equal, round and reactive to light, Normal conjunctiva.    HENT:  Tympanic membranes are clear, Oral mucosa is moist.    Neck:  Supple, Non-tender, No carotid bruit, No lymphadenopathy.    Respiratory:  Respirations are non-labored.    Cardiovascular:  Normal rate, Regular rhythm, No edema.    Gastrointestinal:  Soft, Non-tender, Non-distended.    Musculoskeletal:  Normal range of motion, Normal gait.    Integumentary:   Warm, No rash.    Neurologic:  Alert, Oriented, No focal deficits.    Cognition and Speech:  Oriented, Speech clear and coherent, Functional cognition intact.    Psychiatric:  Cooperative, Appropriate mood & affect, Normal judgment, Patient's GDS and CAGE questionnaire reviewed and discussed with patient. .       Impression and Plan   Course:  Progressing as expected.    Plan:  Discussed  preventative services.  Appropriate weight and diet discussed.  Discussed Advance Life Directives.    Preventative services needed::  Preventative services checklist reviewed, updated and copy given to patient.  Please see scanned document.         HIV risk screening: Yes.         Smoking/tobacco use cessation counseling: not applicable, pt nonsmoker.    Patient Instructions:          Limitations: Limit caffeine intake, Limit alcohol consumption.         Counseled: Patient, Regarding treatment, Regarding medications, Diet, Activity, Verbalized understanding.    Diagnosis     Medicare annual wellness visit, initial (GQI24-UI Z00.00).     Osteopenia (HVY42-MH M85.80).     Need for hepatitis C screening test (QCA15-LW Z11.59).     Medicare annual wellness visit, initial (FSJ84-EM Z00.00).     Immunization due (MDH94-RG Z23).     Hypertension (QRW94-MF I10).     Hyperlipidemia (XAF14-EU E78.5).     Encounter for screening for HIV (ARU28-WM Z11.4).     Orders     Orders (Selected)   Outpatient Orders  Ordered  DEXA Scan (Request): Osteopenia  Ordered (In Transit)  Basic Metabolic Panel* ([facility]): Specimen Type: Serum, Collection Date: 11/16/18 8:36:00 CST  HIV-1/2 Antigen and Antibodies, Fourth Generation, with Reflexes* ([facility]): Specimen Type: Serum, Collection Date: 11/16/18 8:52:00 CST  Hepatitis Panel* ([facility]): Specimen Type: Serum, Collection Date: 11/16/18 8:50:00 CST  Lipid panel with reflex to direct ldl* ([facility]): Specimen Type: Serum, Collection Date: 11/16/18 8:36:00 CST  Vit D 25-Hydroxy Tot (D2, D3) LC/MS/MS* ([facility]):  Specimen Type: Serum, Collection Date: 11/16/18 8:36:00 CST  Completed  85527 imadm prq id subq/im njxs 1 vaccine (Charge): Quantity: 1, Immunization due  21360 imadm prq id subq/im njxs 1 vaccine (Charge): Quantity: 1, Immunization due  91115 influenza vaccine splt prsrv free inc antigen im (Charge): Quantity: 1, Immunization due  93188 pneumococcal conj vaccine 13 valent im (Charge): Quantity: 1, Immunization due  Fluzone High-Dose [id number]: 0.5 mL, IM, once  Prevnar 13: 0.5 mL, im, once.     Total time spent with patient and coordination of care:  _  minutes

## 2022-02-16 NOTE — TELEPHONE ENCOUNTER
---------------------  From: Jocelyn Lebron MD   To: [facility] Message Pool ([id number]);     Sent: 2021 3:01:00 PM CST  Subject: General Message       pls help pt get new kit for fob,her was  so not run      Results:  Date Result Name Value   2021 2:39 AM Fecal Globin See [id number] Spoke with patient and let her know her previous kit was . Pt agrees to do again. I will mail her a new kit.

## 2022-02-16 NOTE — NURSING NOTE
Phone Message    PCP:    NELLIF      Time of Call:  9:28       Person Calling:  Pt  Phone number:  [phone]    Note:  patient called for refill of Celexa.  Appointment has been scheduled for next week. Medication refilled to [facility]

## 2022-02-16 NOTE — PROGRESS NOTES
Chief Complaint    Hypertention check  History of Present Illness      patient present to clinic today for follow up  HTN, has been checking BP at home and they range from 150-160/70-90.   Daughter had preeclamsia so pt decided to get her BP checked out.  no headache or chest pain.      Review of systems 12 point review of systems negative except as per HPI      Exam:      General: alert and oriented ×3 no acute distress.      HEENT: pupils are equal round and reactive to light extraocular motion is intact. Normocephalic and atraumatic.       Hearing is grossly normal and there is no otorrhea. TM pearly grey with normal likght reflex      Nares are patent there is no rhinorrhea.       Mucous membranes are moist and pink.      Chest: has bilateral rise with no increased work of breathing.  ctab no wheezes,  rhonchi or rales      Cardiovascular: normal perfusion and brisk capillary refill.  nml s1s2, no m/g/r      Musculoskeletal: no gross focal abnormalities and normal gait.      Neuro: no gross focal abnormalities and memory seems intact.      Psychiatric: speech is clear and coherent and fluent. Patient dressed appropriately for the weather. Mood is appropriate and affect is full.                     Discussed with patient to return to clinic if symptoms worsen or do not improve  Physical Exam   Vitals & Measurements    T: 97.9   F (Tympanic)  HR: 90(Peripheral)  BP: 140/70  SpO2: 99%     WT: 140.6 lb   Assessment/Plan       Hypertension (I10)         Ordered:          lisinopril, = 1 tab(s) ( 10 mg ), PO, Daily, # 30 tab(s), 1 Refill(s), Type: Maintenance, [id number], 1 tab(s) Oral daily, (Ordered)          lisinopril, = 1 tab(s) ( 10 mg ), PO, Daily, # 90 tab(s), 3 Refill(s), Type: Maintenance, (Ordered)          Return to Clinic (Request), RFV: lab BMP and CSS for BP check, Return in 2 weeks                Orders:         Hepatitis Panel* ([facility]), Specimen Type: Serum, Collection Date:  18 8:50:00 CST         HIV-1/2 Antigen and Antibodies, Fourth Generation, with Reflexes* ([facility]), Specimen Type: Serum, Collection Date: 18 8:52:00 CST         Vit D 25-Hydroxy Tot (D2, D3) LC/MS/MS* ([facility]), Specimen Type: Serum, Collection Date: 18 8:36:00 CST  Patient Information     Name:TERRY FORRESTER      Address:      [address] [address]     Sex:Female     YOB: 1955     [phone]     Emergency Contact:[facility] EMERGENCY, CONTACT     MRN:[id number]     FIN:[id number]     Location:[facility]     Date of Service:2018      Primary Care Physician:       Derek Hernández MD, [phone]      Attending Physician:       Vi MONAHAN, Jocelyn, [phone]  Problem List/Past Medical History    Ongoing     Diverticulosis     Ex-smoker     Hyperlipidemia     Menopause     Osteopenia     Third degree hemorrhoids    Historical     *Hospitalized@[facility] - Rectal prolapse     AA (alcohol abuse)     Glaucoma     Lyme disease     Pregnancy     Pregnancy     Pregnancy     Pregnancy  Procedure/Surgical History     Mesh rectopexy (2016)            Comments: Rigid proctoscopy..     THD - Transanal hemorroidal dearterialization (2016)           Cardiac computed tomography for calcium scoring (2015)            Comments: Total Agatston calcium score is 5.     Colonoscopy (2011)            Comments: Normal; repeat 2021.     Sigmoidoscopy (2005)           Laser surgery ()            Comments: on R eye for Glaucoma.     Therapeutic  ()            section ()        Medications     Calcium 600+D: 1 tab(s), po, daily.     Aleve 220 mg oral tablet: 220 mg, 1 tab(s), po, daily.     MiraLax oral powder for reconstitution: 17 gm, po, daily, 0 Refill(s).     loratadine: po, prn, 0 Refill(s).     citalopram 20 mg oral tablet: 20 mg, 1 tab(s), PO, Daily, 90 tab(s), 3 Refill(s).      triamcinolone 0.1% topical cream: 1 kurtis, TOP, BID, 30 g, 2 Refill(s).     lisinopril 10 mg oral tablet: 10 mg, 1 tab(s), PO, Daily, 30 tab(s), 1 Refill(s).     lisinopril 10 mg oral tablet: 10 mg, 1 tab(s), PO, Daily, 90 tab(s), 3 Refill(s).          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 Highest education level: University degree(s)., 11/16/2018     Exercise and Physical Activity     Home and Environment      Marital status: . Spouse/Partner name: Mino. 3 children. Risks in environment: owns secured gun., 11/20/2017     Nutrition and Health      Type of diet: Regular., 10/04/2016     Sexual      Sexually active: Yes. Sexual orientation: Heterosexual., 01/18/2012     Substance Abuse      Past, Marijuana, 11/16/2018     Tobacco      Former smoker, quit more than 30 days ago, Cigarettes, 11/16/2018  Family History    Allergic Rhinitis: Mother.    Arthritis: Mother.    Breast cancer: Mother (Dx at 75) and Cousin (M).    Crohn disease: Brother.    Depression: Mother.    Hypercholesterolemia: Mother.    Prostate cancer.: Father.    Sister: History is negative    Daughter: History is negative    Son: History is negative    Son: History is negative  Immunizations      Vaccine Date Status      pneumococcal (PCV13) 11/16/2018 Given      influenza virus vaccine, inactivated 11/16/2018 Given      influenza 11/16/2018 Recorded      influenza virus vaccine, inactivated 11/14/2017 Given      influenza virus vaccine, inactivated 10/04/2016 Given      influenza virus vaccine, inactivated 08/31/2015 Given      tetanus/diphth/pertuss (Tdap) adult/adol 08/18/2014 Given      influenza virus vaccine, inactivated 12/01/2010 Given      influenza, H1N1, inactivated 12/15/2009 Recorded      Td 04/04/2003 Recorded       Td 03/10/1993 Recorded      Td 04/01/1981 Recorded  Lab Results       Lab Results (Last 4 results within 90 days)        Sodium Level: 140 mmol/L [135 mmol/L - 146 mmol/L] (11/16/18 09:33:00)       Potassium Level: 4.3 mmol/L [3.5 mmol/L - 5.3 mmol/L] (11/16/18 09:33:00)       Chloride Level: 105 mmol/L [98 mmol/L - 110 mmol/L] (11/16/18 09:33:00)       CO2 Level: 29 mmol/L [20 mmol/L - 32 mmol/L] (11/16/18 09:33:00)       Glucose Level: 85 mg/dL [65 mg/dL - 99 mg/dL] (11/16/18 09:33:00)       BUN: 21 mg/dL [7 mg/dL - 25 mg/dL] (11/16/18 09:33:00)       Creatinine Level: 0.73 mg/dL [0.5 mg/dL - 0.99 mg/dL] (11/16/18 09:33:00)       BUN/Creat Ratio: NOT APPLICABLE [6  - 22] (11/16/18 09:33:00)       eGFR: 86 mL/min/1.73m2 [8.6 mg/dL - 10.4 mg/dL] (11/16/18 09:33:00)       eGFR : 100 mL/min/1.73m2 [6  - 22] (11/16/18 09:33:00)       Calcium Level: 10.1 mg/dL [8.6 mg/dL - 10.4 mg/dL] (11/16/18 09:33:00)       Vitamin D 25 OH: 40 ng/mL [30 ng/mL - 100 ng/mL] (11/16/18 09:33:00)       Vitamin D 25-OH, D2: <4 [6  - 22] (11/16/18 09:33:00)       Vitamin D 25-OH, D3: 40 ng/mL [6  - 22] (11/16/18 09:33:00)       Cholesterol: 262 mg/dL High [8.6 mg/dL - 10.4 mg/dL] (11/16/18 09:33:00)       Non-HDL Cholesterol: 189 High [20 mmol/L - 32 mmol/L] (11/16/18 09:33:00)       HDL: 73 mg/dL [65 mg/dL - 99 mg/dL] (11/16/18 09:33:00)       Cholesterol/HDL Ratio: 3.6 [0.5 mg/dL - 0.99 mg/dL] (11/16/18 09:33:00)       LDL: 170 High [0.5 mg/dL - 0.99 mg/dL] (11/16/18 09:33:00)       Triglyceride: 83 mg/dL [8.6 mg/dL - 10.4 mg/dL] (11/16/18 09:33:00)       Hep A Ab: NON-REACTIVE [0.5 mg/dL - 0.99 mg/dL] (11/16/18 09:33:00)       Hep B Core Ab: NON-REACTIVE [0.5 mg/dL - 0.99 mg/dL] (11/16/18 09:33:00)       Hep Bs Ag: NON-REACTIVE [0.5 mg/dL - 0.99 mg/dL] (11/16/18 09:33:00)       Hep Bs Ab: NON-REACTIVE [0.5 mg/dL - 0.99 mg/dL] (11/16/18 09:33:00)       Hep C Ab: NON-REACTIVE [0.5 mg/dL - 0.99 mg/dL] (11/16/18 09:33:00)        Hep C Signal to Cutoff: 0.01 [6  - 22] (11/16/18 09:33:00)       HIV Ag/Ab: NON-REACTIVE [20 mmol/L - 32 mmol/L] (11/16/18 09:33:00)

## 2022-02-16 NOTE — LETTER
(Inserted Image. Unable to display)       May 22, 2019      TERRY FORRESTER   [address] [place], [place] [id number]          Dear TERRY,      Thank you for selecting [facility] for your healthcare needs.     Our records indicate you are due for the following services:    Fasting Lab Tests ~ Please do not eat or drink anything 10 hours prior to your scheduled appointment time.  (Water and any medications that you may need are allowed unless directed otherwise.)    If you had your labs done at another facility or with Direct Access Lab Testing at [facility], please bring in a copy of the results to your next visit, mail a copy, or drop off a copy of your results to your Healthcare Provider.    Follow-up Office Visit    You are due for lab work and an office visit; please schedule the lab appointment 1 week before the office visit.  This will assure all results are available to discuss with your Healthcare Provider during your visit.    **It is very helpful if you bring your medication bottles to your appointment.  This assures we have all of your current medications, including strength and dosing information, documented accurately in your medical record.    To schedule an appointment or if you have further questions, please contact your primary clinic:   [facility]       [phone]   [facility]       [phone]              [facility]     [phone]    Powered by [employer]    Sincerely,    Jocelyn Lebron MD

## 2022-02-16 NOTE — PROGRESS NOTES
Chief Complaint    AWV. Preventive handout reviewed.  History of Present Illness      The patient presents today for general exam.  ADL's and safety were reviewed.       1.  Hearing Impairment (hearing aids, symptoms, history):  No problems noted.  screening today normal      2.  Activities of Daily Living (hygiene, eating, cooking, cleaning, shopping, errands): No problems noted.       3.  Falls Risk (walking, transferring, cane, walker, aids):  No problems noted.      4.  Home Safety (surfaces, steps):  No problems noted.  Suggested fastening down throw rugs and using non-slip mats in the shower         Patient's PHQ9 and CAGE questionnaire reviewed and discussed with patient.             The patient answers questions regarding year, date, and city she lives in appropriately.  No evidence of cognitive impairment is noted.      chart review shows pt has osteopenia, last dexa in 2018, continues to do weight bearing exercise and ca/vit D supplement, would like to defer dexa for another year      prediabetes, encouraged referral to ada website and will recheck hgba1c      will be due for colon cancer screening in 01/2021, no personal or family hx of polyps or colon cancer, she would refer to do FIT or COLOGUARD, will need to check with her insurance to make sure cologuard is covered      uses the citalopram for hotflashes and for depression, mood is currently well controlled.  she had run out of the medicine for awhile and experienced dizziness and some hot flashes, still occasionally gets a hot flash with the citalopram but overall sx for depression and hotflashes are well enough controlled that we do not need to make any med changes      due for mammogram, aware she can contact the hospital to schedule this, does not need an order from me.      HTN  well controlled, no chest pain or lightheadedness, sometimes gets dizzy if she stands and starts moving too quickly but seldom, and if she takes a moment to ground  herself then it improves        I have reviewed the completed Health Risk Assessment and Health History forms with the patient and positives are noted.  We have agreed on the plan below for identified risk factors.  Please see copy of scanned forms.      Review of systems is negative except as per HPI including no fevers, chills, sore throat, runny nose, nausea, vomiting, constipation, diarrhea, rash or new skin lesions, chest pain, palpitations, slurred speech, new paresthesia, shortness of breath or wheeze.             Exam:      see vitals listed below      General: alert and oriented ×3 no acute distress.      HEENT: Normocephalic and atraumatic.       Eyes pupils are equal round and reactive to light extraocular motion is intact. normal conjunctiva      Hearing is grossly normal and there is no otorrhea. Tympanic membranes are pearly grey with a normal light reflex.      Chest: has bilateral rise with no increased work of breathing. clear to auscultation without wheezes, rhonchi, or rales.      Cardiovascular: normal perfusion and brisk capillary refill. S1S2 with regular rate and rhythm and no murmurs, gallops or rubs.      Musculoskeletal: no gross focal abnormalities and normal gait.      Neuro: no gross focal abnormalities and memory seems intact.  CN 2-12 are grossly intact.      Psychiatric: speech is clear and coherent and fluent. Patient dressed appropriately for the weather. Mood is appropriate and affect is full.      Abd:  normal BS      Skin: no rash or lesion identified      Discussed:      using sunscreen, protecting from sunburn,      refer to usdachoosemyplate.gov, AHA and ADA for diet and exercise recommendations      consume 1201-0250 mg calcium daily      regular self skin checks      get 150min /week of aerobic exercise  Physical Exam   Vitals & Measurements    T: 97.9  F (Tympanic)  HR: 74 (Peripheral)  BP: 110/72  SpO2: 98%     HT: 59.5 in  WT: 139 lb   Assessment/Plan       Hot flashes  (R23.2)         cont citalopram,         Ordered:          17968 office outpatient visit 25 minutes (Charge), Quantity: 1, Hot flashes  Major depression  Prediabetes  Hyperlipidemia LDL goal <100                Hyperlipidemia LDL goal <100 (E78.5)         recheck HDL, referred to AHA website for additional info on TLC         Ordered:          34574 office outpatient visit 25 minutes (Charge), Quantity: 1, Hot flashes  Major depression  Prediabetes  Hyperlipidemia LDL goal <100          Basic Metabolic Panel* ([facility]), Specimen Type: Serum, Collection Date: 11/17/20 11:13:00 CST          Hemoglobin A1c* ([facility]), Specimen Type: Blood, Collection Date: 11/17/20 11:13:00 CST          Lipid panel with reflex to direct ldl* ([facility]), Specimen Type: Serum, Collection Date: 11/17/20 11:13:00 CST                Major depression (F32.0)         well controlled, con the citalopram         Ordered:          72585 office outpatient visit 25 minutes (Charge), Quantity: 1, Hot flashes  Major depression  Prediabetes  Hyperlipidemia LDL goal <100                Prediabetes (R73.03)         referred to ADA website for TLC info and recheck hgba1c         Ordered:          24747 office outpatient visit 25 minutes (Charge), Quantity: 1, Hot flashes  Major depression  Prediabetes  Hyperlipidemia LDL goal <100          Basic Metabolic Panel* ([facility]), Specimen Type: Serum, Collection Date: 11/17/20 11:13:00 CST          Hemoglobin A1c* ([facility]), Specimen Type: Blood, Collection Date: 11/17/20 11:13:00 CST          Lipid panel with reflex to direct ldl* ([facility]), Specimen Type: Serum, Collection Date: 11/17/20 11:13:00 CST                Screen for colon cancer (Z12.11)         pt will check with her insurnce and return either the FIT or cologuard         Ordered:          Return to Clinic (Request), RFV: FIT kit or cologuard in 01/2021                Well adult exam (Z00.00)         Ordered:          26807 periodic preventive med  est patient 65yrs+> (Charge), Quantity: 1, Well adult exam          Basic Metabolic Panel* ([facility]), Specimen Type: Serum, Collection Date: 11/17/20 11:13:00 CST          Hemoglobin A1c* ([facility]), Specimen Type: Blood, Collection Date: 11/17/20 11:13:00 CST          Lipid panel with reflex to direct ldl* ([facility]), Specimen Type: Serum, Collection Date: 11/17/20 11:13:00 CST                Orders:         citalopram, = 1 tab(s) ( 20 mg ), Oral, daily, # 90 tab(s), 3 Refill(s), Type: Soft Stop, [facility]: [facility], 1 tab(s) Oral daily, 59.5, in, 12/21/19 11:17:00 CST, Height Measured, 139, lb, 11/17/20 11:05:00 CST, Weight Measured, (Ordered)         lisinopril, See Instructions, Instructions: TAKE 1 TABLET BY MOUTH EVERY DAY, # 90 EA, 3 Refill(s), Type: Soft Stop, [id number], TAKE 1 TABLET BY MOUTH EVERY DAY, 59.5, in, 12/21/19 11:17:00 CST, Height Measured, 139, lb, 11/17/20 11:05:00 CST,..., (Ordered)         triamcinolone topical, 1 kurtis, TOP, BID, # 30 g, 2 Refill(s), Type: Maintenance, Pharmacy: [facility], 1 kurtis Topical bid, 59.5, in, 12/21/19 11:17:00 CST, Height Measured, 139, lb, 11/17/20 11:05:00 CST, Weight Measured, (Ordered)         Return to Clinic (Request), RFV: AWV, Return in December      prediabetes, encouraged referral to ada website and will recheck hgba1c      hx of AA, in remission         Patient Information     Name:MITZY TERRY FLORENTINO      Address:      [address] [id number]     Sex:Female     YOB: 1955     [phone]     Emergency Contact:LASHAE FORRESTER     MRN:[id number]     FIN:[id number]     Location:[facility]     Date of Service:11/17/2020      Primary Care Physician:       Jocelyn Lebron MD, [phone]      Attending Physician:       Jocelyn Lebron MD, [phone]  Problem List/Past Medical History    Ongoing     Diverticulosis     Ex-smoker     Hot flashes      Hyperlipidemia     Menopause     Osteopenia     Third degree hemorrhoids    Historical     *Hospitalized@[facility] - Rectal prolapse     AA (alcohol abuse)     Glaucoma     Lyme disease     Pregnancy     Pregnancy     Pregnancy     Pregnancy  Procedure/Surgical History     Mesh rectopexy (2016)            Comments: Rigid proctoscopy..     THD - Transanal hemorroidal dearterialization (2016)           Cardiac computed tomography for calcium scoring (2015)            Comments: Total Agatston calcium score is 5.     Colonoscopy (2011)            Comments: Normal; repeat 2021.     Sigmoidoscopy (2005)           Laser surgery (2000)            Comments: on R eye for Glaucoma.     Therapeutic  (1994)            section ()        Medications    Aleve 220 mg oral tablet, 220 mg= 1 tab(s), Oral, daily    Calcium 600+D, 1 tab(s), Oral, daily    citalopram 20 mg oral tablet, 20 mg= 1 tab(s), Oral, daily, 3 refills    lisinopril 10 mg oral tablet, See Instructions, 3 refills    loratadine, Oral, prn    MiraLax oral powder for reconstitution, 17 gm, Oral, daily    pimecrolimus 1% topical cream, See Instructions    triamcinolone 0.1% topical cream, 1 kurtis, Topical, bid, 2 refills  Allergies    Pneumovax 23 (Localized swelling)  Social History    Smoking Status     Never smoker     Alcohol - Past      Past, Recovering alcoholic; quit in      Electronic Cigarette/Vaping      Electronic Cigarette Use: Never.     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 Highest education level: University degree(s).     Exercise - Occasional exercise     Home/Environment      Marital status: . Spouse/Partner name: Mino. Karl children. Living situation: Home/Independent. Injuries/Abuse/Neglect in household: No. Feels unsafe at home: No. Family/Friends available for support: Yes. Risks in environment: Stairs, owns secured gun.      Nutrition/Health      Type of diet: Regular. Wants to lose weight: Yes. Sleeping concerns: No. Feels highly stressed: No.     Sexual      Identifies as female     Substance Abuse      Past, Marijuana     Tobacco - Past      Former smoker, quit more than 30 days ago, Cigarettes, Previous treatment: Nicotine replacement. Ready to change: Yes.  Family History    Alcoholism: Father.    Allergic Rhinitis: Mother.    Allergic rhinitis: Mother and Sister.    Arthritis: Mother.    Breast cancer: Mother (Dx at 75) and Cousin (M).    Crohn disease: Brother.    Depression: Mother.    Hypercholesterolemia: Mother.    Melanoma: Mother.    Prostate cancer.: Father.    Daughter: History is negative    Son: History is negative    Son: History is negative  Immunizations      Vaccine Date Status          influenza virus vaccine, inactivated 10/22/2020 Recorded          zoster vaccine, inactivated 10/22/2020 Recorded          pneumococcal (PPSV23) 12/19/2019 Given          influenza virus vaccine, inactivated 12/19/2019 Given          pneumococcal (PCV13) 11/16/2018 Given          influenza virus vaccine, inactivated 11/16/2018 Given          influenza 11/16/2018 Recorded          influenza virus vaccine, inactivated 11/14/2017 Given          influenza virus vaccine, inactivated 10/04/2016 Given          influenza virus vaccine, inactivated 08/31/2015 Given          tetanus/diphth/pertuss (Tdap) adult/adol 08/18/2014 Given          influenza virus vaccine, inactivated 12/01/2010 Given          influenza, H1N1, inactivated 12/15/2009 Recorded          Td 04/04/2003 Recorded          Td 03/10/1993 Recorded          Td 04/01/1981 Recorded

## 2022-02-16 NOTE — LETTER
(Inserted Image. Unable to display)     January 18, 2021      TERRY FORRESTER   [address] [place], [place] [id number]          Dear TERRY,      Thank you for selecting [facility] (previously [facility]) for your healthcare needs.    Our records indicate you are due for the following services:     Screening Test for Colon Cancer, called InSure[facility] ~ Stool cards were sent home with you within the past 3 months and have not been returned to us for testing.  You may either drop off your stool cards at your clinic, or send them to us in the mail.       (FYI   Regarding office visits: In some instances, a video visit or telephone visit may be offered as an option.)      To schedule an appointment or if you have further questions, please contact your clinic at [phone].      Powered by [employer] and [employer]    Sincerely,    Jocelyn Lebron MD

## 2022-02-16 NOTE — LETTER
(Inserted Image. Unable to display)         March 25, 2021        TERRY FORRESTER   [address] [place], [place] [id number]        Dear TERRY,     Thank you for selecting [facility] for your healthcare needs. Below you will find the results of your recent test(s) done at our clinic.      Your test is normal.   We should repeat it in 1 year.      Result Name Current Result Previous Result   Fecal Globin  See comment 3/3/2021  See comment 2/11/2021     Please contact my practice at [phone] if you have any questions or concerns.     Sincerely,        Jocelyn Lebron MD      What do your labs mean?  Below is a glossary of commonly ordered labs:  LDL   Bad Cholesterol   HDL   Good Cholesterol  AST/ALT   Liver Function   Cr/Creatinine   Kidney Function  Microalbumin   Kidney Function  BUN   Kidney Function  PSA   Prostate    TSH   Thyroid Hormone  HgbA1c   Diabetes Test   Hgb (Hemoglobin)   Red Blood Cells

## 2022-02-16 NOTE — NURSING NOTE
Comprehensive Intake Entered On:  12/21/2019 11:22 AM CST    Performed On:  12/21/2019 11:17 AM CST by Janeth Polo RN               Summary   Chief Complaint :   Patient is here for reaction to pneumonia vaccine. Has shot on Thursday. c/o tenderness, swelling, redness in right arm.    Menstrual Status :   Postmenopausal   Height Measured :   59.5 in(Converted to: 4 ft 11 in, 151.13 cm)    Ht/Wt Measurement Refused by Patient? :   Yes   Systolic Blood Pressure :   108 mmHg   Diastolic Blood Pressure :   64 mmHg   Mean Arterial Pressure :   79 mmHg   Peripheral Pulse Rate :   98 bpm   BP Site :   Right arm   BP Method :   Manual   HR Method :   Electronic   Temperature Tympanic :   97.9 DegF(Converted to: 36.6 DegC)    Oxygen Saturation :   98 %   Janeth Polo RN - 12/21/2019 11:17 AM CST   Health Status   Allergies Verified? :   Yes   Medication History Verified? :   Yes   Pre-Visit Planning Status :   N/A   Tobacco Use? :   Never smoker   Janeth Polo RN - 12/21/2019 11:17 AM CST   Consents   Consent for Immunization Exchange :   Consent Granted   Consent for Immunizations to Providers :   Consent Granted   Janeth Polo RN - 12/21/2019 11:17 AM CST   Meds / Allergies   (As Of: 12/21/2019 11:22:18 AM CST)   Allergies (Active)   No Known Medication Allergies  Estimated Onset Date:   Unspecified ; Created By:   Jose Elias Garcia CMA; Reaction Status:   Active ; Category:   Drug ; Substance:   No Known Medication Allergies ; Type:   Allergy ; Updated By:   Jose Elias Garcia CMA; Reviewed Date:   12/21/2019 11:20 AM CST        Medication List   (As Of: 12/21/2019 11:22:19 AM CST)   Prescription/Discharge Order    triamcinolone topical  :   triamcinolone topical ; Status:   Prescribed ; Ordered As Mnemonic:   triamcinolone 0.1% topical cream ; Simple Display Line:   1 kurtis, TOP, BID, 30 g, 2 Refill(s) ; Ordering Provider:   Jocelyn Lebron MD; Catalog Code:   triamcinolone topical ; Order Dt/Tm:   11/16/2018 10:04:16 AM  CST          lisinopril  :   lisinopril ; Status:   Prescribed ; Ordered As Mnemonic:   lisinopril 10 mg oral tablet ; Simple Display Line:   See Instructions, TAKE 1 TABLET BY MOUTH EVERY DAY, 30 tab(s), 11 Refill(s) ; Ordering Provider:   Jocelyn Lebron MD; Catalog Code:   lisinopril ; Order Dt/Tm:   12/19/2019 9:51:47 AM CST          lisinopril  :   lisinopril ; Status:   Prescribed ; Ordered As Mnemonic:   lisinopril 10 mg oral tablet ; Simple Display Line:   10 mg, 1 tab(s), PO, Daily, 30 tab(s), 1 Refill(s) ; Ordering Provider:   Jocelyn Lebron MD; Catalog Code:   lisinopril ; Order Dt/Tm:   12/18/2018 11:23:52 AM CST          citalopram  :   citalopram ; Status:   Prescribed ; Ordered As Mnemonic:   citalopram 20 mg oral tablet ; Simple Display Line:   20 mg, 1 tab(s), PO, Daily, 90 tab(s), 2 Refill(s) ; Ordering Provider:   Jocelyn Lebron MD; Catalog Code:   citalopram ; Order Dt/Tm:   12/19/2019 9:24:08 AM CST            Home Meds    polyethylene glycol 3350  :   polyethylene glycol 3350 ; Status:   Documented ; Ordered As Mnemonic:   MiraLax oral powder for reconstitution ; Simple Display Line:   17 gm, po, daily, 0 Refill(s) ; Catalog Code:   polyethylene glycol 3350 ; Order Dt/Tm:   10/4/2016 2:51:12 PM CDT          naproxen  :   naproxen ; Status:   Documented ; Ordered As Mnemonic:   Aleve 220 mg oral tablet ; Simple Display Line:   220 mg, 1 tab(s), po, daily ; Catalog Code:   naproxen ; Order Dt/Tm:   4/26/2013 8:58:44 AM CDT          loratadine  :   loratadine ; Status:   Documented ; Ordered As Mnemonic:   loratadine ; Simple Display Line:   po, prn, 0 Refill(s) ; Catalog Code:   loratadine ; Order Dt/Tm:   4/12/2017 11:25:47 AM CDT          calcium-vitamin D  :   calcium-vitamin D ; Status:   Documented ; Ordered As Mnemonic:   Calcium 600+D ; Simple Display Line:   1 tab(s), po, daily ; Catalog Code:   calcium-vitamin D ; Order Dt/Tm:   4/26/2013 8:58:20 AM CDT

## 2022-02-16 NOTE — TELEPHONE ENCOUNTER
---------------------  From: Travis/Ana Paula SHELTON (Phone Messages Pool ([id number]_[facility]))   Sent: 11/22/2021 9:30:00 AM CST  Subject: General Message     Phone Message    PCP: ARIADNA    Time of Call: 0906    Phone Number: [phone]    Returned call at: 0929    Note: Patient LVM stating that she needs a refill of Lisinopril LF 11/17/20 #90 R3    Per chart, pt has appt 11/30/21     Will fill #30 per protocol    LDVM

## 2022-02-16 NOTE — LETTER
(Inserted Image. Unable to display)     January 04, 2019      TERRY FORRESTER   [address] [place], [place] [id number]          Dear TERRY,      Thank you for selecting [facility] (previously [place] [place] [facility]) for your healthcare needs.      Our records indicate you are due for the following services:     Clinical Support Staff (CSS)-Only Blood Pressure Check ~ Please stop in anytime to have your blood pressure rechecked. This is a free service and no appointment necessary.     So we can best determine if your medications are effective in lowering your blood pressure, please make sure your blood pressure medicine has been in your system for at least 1-2 hours prior to coming in.  We encourage you to avoid caffeine or other stimulants prior to having your blood pressure checked and come at a time when you are not feeling rushed.     If you check your blood pressure at home, please bring in your blood pressure monitor and home blood pressure readings.  We will check your machine for accuracy and also share your home readings with your Healthcare Provider.     Non-Fasting Labs.    If you had your labs done at another facility or with Direct Access Lab Testing at [facility], please bring in a copy of the results to your next visit, mail a copy, or drop off a copy of your results to your Healthcare Provider.      To schedule an appointment or if you have further questions, please contact your primary clinic:   [facility]       [phone]   [facility]       [phone]              [facility]     [phone]      Powered by [employer]    Sincerely,    Jocelyn Lebron MD

## 2022-02-16 NOTE — PROGRESS NOTES
Patient:   TERRY FORRESTER            MRN: [id number]            FIN: [id number]               Age:   62 years     Sex:  Female     :  1955   Associated Diagnoses:   Skin cancer screening   Author:   Jud Dallas      Visit Information      Date of Service: 2017 08:32 am  Performing Location: [facility]  Encounter#: [id number]      Primary Care Provider (PCP):  Derek Hernández MD    NPI# [id number]      Referring Provider:  Jud Dallas    NPI# [id number]   Visit type:  General concerns.    Source of history:  Self.       Chief Complaint   2017 8:36 AM CST   Skin exam per TFS     Here for a skin examination for cancer/suspicious moles      History of Present Illness   She is actually referred by ODILON Diaz, who performed her recent annual exam  was advised to have skin exam given great sun exposure over the years (she is an avid )  She has never had a skin cancer, her mother had one on her face and lived till her 80's  She has no specific lesions of concern      Review of Systems            Health Status   Allergies:    Allergic Reactions (Selected)  No Known Medication Allergies   Medications:  (Selected)   Prescriptions  Prescribed  citalopram 20 mg oral tablet: 1 tab(s) ( 20 mg ), PO, Daily, # 90 tab(s), 3 Refill(s), Type: Maintenance, Pharmacy: [facility], Appointment scheduled, 1 tab(s) po daily  triamcinolone 0.1% topical cream: 1 kurtis, TOP, BID, # 30 g, 2 Refill(s), Type: Maintenance, Pharmacy: [facility], 1 kurtis top bid  Documented Medications  Documented  Aleve 220 mg oral tablet: 1 tab(s) ( 220 mg ), po, daily, 0 Refill(s), Type: Maintenance  Calcium 600+D: 1 tab(s), po, daily, 0 Refill(s), Type: Maintenance  MiraLax oral powder for reconstitution: ( 17 gm ), po, daily, 0 Refill(s), Type: Maintenance  loratadine: po, prn, 0 Refill(s), Type: Maintenance   Problem list:    All  Problems  Ex-smoker / SNOMED CT 06247644 / Confirmed  Diverticulosis / SNOMED CT 4870502397 / Confirmed  Third degree hemorrhoids / SNOMED CT 070199540 / Confirmed  Menopause / SNOMED CT 254884289 / Confirmed  Resolved: *[facility]ospitalized[facility] - Rectal prolapse  Resolved: AA (alcohol abuse) / SNOMED CT 40107978  Resolved: Lyme disease / SNOMED CT 64985454  Resolved: Glaucoma / SNOMED CT 80646620  Resolved: Pregnancy / SNOMED CT 122297226  Resolved: Pregnancy / SNOMED CT 535928216  Resolved: Pregnancy / SNOMED CT 872954291  Resolved: Pregnancy / SNOMED CT 393376617  Canceled: Depression / ICD-9-      Histories   Past Medical History:    Active  Diverticulosis (3728745019): Onset on 2011 at 55 years.  Menopause (419333535): Onset in  at 52 years.  Ex-smoker (27499959)  Resolved  *[facility]ized[facility] - Rectal prolapse: Onset on 2016 at 61 years.  Resolved on 2016 at 61 years.  Lyme disease (24812596): Onset in the month of 2009 at 54 years  Resolved.  AA (alcohol abuse) (37817397):  Resolved.  Glaucoma (78404452):  Resolved.  Pregnancy (976575396):  Resolved on 1982 at 27 years.  Pregnancy (201344505):  Resolved on 3/1/1984 at 28 years.  Pregnancy (428853893):  Resolved on 10/1/1987 at 32 years.  Pregnancy (174641113):  Resolved on 1990 at 34 years.   Family History:    Breast cancer  Cousin (M)  Mother: onset at 75 .  Hypercholesterolemia  Mother  Depression  Mother  Crohn disease  Brother  Prostate cancer.  Father ()     Procedure history:    Mesh rectopexy (SNOMED CT 967702716) performed by Airam Gamez MD on 2016 at 61 Years.  Comments:  2016 8:17 AM - Brinda Rhodes  Rigid proctoscopy.  THD - Transanal hemorroidal dearterialization performed by Mino Newell MD on 2016 at 60 Years.  Cardiac computed tomography for calcium scoring (SNOMED CT 4741475234) on 2015 at 60 Years.  Comments:  10/23/2015 11:46 AM - Ashtyn Cid  Total Agatston calcium score is  5  Colonoscopy (SNOMED CT 888857414) performed by Derek Gardiner MD on 2011 at 55 Years.  Comments:  2011 11:05 AM - Derek Gardiner MD  Normal; repeat 2021  Sigmoidoscopy (SNOMED CT 61665768) on 2005 at 50 Years.  Laser surgery (SNOMED CT 87251837) in the month of 2000 at 44 Years.  Comments:  2010 9:18 AM - Renetta Jensen  on R eye for Glaucoma  Therapeutic  (SNOMED CT 14706897) in the month of 1994 at 38 Years.   section (SNOMED CT 00948448) in  at 27 Years.   Social History:        Alcohol Assessment            Past, Recovering alcoholic      Tobacco Assessment            Former smoker, quit more than 30 days ago, Cigarettes      Substance Abuse Assessment            Past, Marijuana      Employment and Education Assessment            Retired, 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      Home and Environment Assessment            Marital status: .  Spouse/Partner name: Mino.  Karl children.  Risks in environment: owns secured gun.      Nutrition and Health Assessment            Type of diet: Regular.      Exercise and Physical Activity Assessment            Exercise frequency: 3-5 times per week.  Exercise type: Aerobics, Weight lifting.      Sexual Assessment            Sexually active: Yes.  Sexual orientation: Heterosexual.        Physical Examination   Vital Signs   2017 8:36 AM CST Temperature Tympanic 97.5 DegF  LOW    Peripheral Pulse Rate 80 bpm    Pulse Site Radial artery    HR Method Manual    Systolic Blood Pressure 148 mmHg  HI    Diastolic Blood Pressure 80 mmHg    Mean Arterial Pressure 103 mmHg    BP Site Right arm    BP Method Manual      Measurements from flowsheet : Measurements   2017 8:36 AM CST Height Measured - Standard 60 in    Weight Measured - Standard 139.8 lb    BSA 1.64 m2    Body Mass Index 27.3 kg/m2      General:  Alert and oriented, Skin.         Appearance: Within normal limits.    Integumentary:  Warm,  Dry, Intact.         Skin phototype: Skin phototype- II. Usually burns, sometimes tans. Fair skin.         Integumentary exam: General appearance of patient is well developed, alert and attentive, well nourished  Pt is interested in having full skin exam     Skin is examined and specific lesions evaluated with dermoscope.     She has multiple areas of sun damaged skin on arms and legs  She opted to leave her bra and socks on for the exam    Conjunctiva and eye lids--no lesions of concern  scalp and face--no lesions of concern  Neck--no lesions of concern  Chest--no lesions of concern  Abdomen--no lesions of concern  back--no lesions of concern  right upper extremity--no lesions of concern  left upper extremity--no lesions of concern  right lower extremity--no lesions of concern  left lower extremity--no lesions of concern  d hands--no lesions of concern.       Impression and Plan   Diagnosis     Skin cancer screening ([id number]-HA Z12.83).     Course:  Unchanged.    Plan:  discussed sun protection, ABCDE of moles, follow up as needed.    Patient Instructions:       Counseled: Patient, Verbalized understanding.    Orders     Orders   Requests (Return to Office):  Return to Clinic (Request) (Order): RFV: one eyar 20 min skin exam with NCB, Return in 1 year.

## 2022-02-16 NOTE — LETTER
(Inserted Image. Unable to display)            December 01, 2021        TERRY FORRESTER   [address] [place] [address]        Dear TERRY,     Thank you for selecting [facility] for your healthcare needs. Below you will find the results of your recent test(s) done at our clinic.       Your diabetes screening shows you are developing some prediabetes.  We know this because your hemoglobin A1c  was higher than normal. Both your fasting blood sugar and your hemoglobin A1c show that you do not have diabetes at this time.  This means that if you can make some healthy lifestyle changes you could prevent yourself from getting diabetes!  The American Diabetes Association website has some great information on healthy lifestyle changes.  I also think the [employer] My Plate is a great resource.    Your cholesterol is also higher than it should be.  Please take a look at the American Heart Association's website for some information on dietary and lifestyle changes your can do to improve your cholesterol.   You are also welcome to meet with our dietitian, or come in to discuss your cardiovascular disease risk with me.  We should recheck a fasting lipid panel in 6 months, then get together afterwards to revisit your cardiovascular disease risk and decide if you need to treat your high cholesterol.       Result Name Current Result Previous Result Reference Range   Cholesterol (mg/dL) ((H)) 251 11/30/2021 ((H)) 264 11/17/2020  - <200   HDL (mg/dL)  73 11/30/2021  69 11/17/2020 > OR = 50 -    Triglyceride (mg/dL)  66 11/30/2021  92 11/17/2020  - <150   LDL ((H)) 162 11/30/2021 ((H)) 174 11/17/2020    Cholesterol/HDL Ratio  3.4 11/30/2021  3.8 11/17/2020  - <5.0   Non-HDL Cholesterol ((H)) 178 11/30/2021 ((H)) 195 11/17/2020  - <130   Glucose Level (mg/dL)  96 11/30/2021  99 11/17/2020 65 - 99   BUN (mg/dL)  25 11/30/2021 ((H)) 26 11/17/2020 7 - 25   Creatinine Level (mg/dL)  0.77 11/30/2021  0.89  11/17/2020 0.50 - 0.99   eGFR (mL/min/1.73m2)  79 11/30/2021  67 11/17/2020 > OR = 60 -    eGFR  (mL/min/1.73m2)  92 11/30/2021  78 11/17/2020 > OR = 60 -    BUN/Creat Ratio  NOT APPLICABLE 11/30/2021 ((H)) 29 11/17/2020 6 - 22   Sodium Level (mmol/L)  139 11/30/2021  139 11/17/2020 135 - 146   Potassium Level (mmol/L)  4.6 11/30/2021  5.1 11/17/2020 3.5 - 5.3   Chloride Level (mmol/L)  105 11/30/2021  104 11/17/2020 98 - 110   CO2 Level (mmol/L)  27 11/30/2021  28 11/17/2020 20 - 32   Calcium Level (mg/dL)  10.1 11/30/2021  10.4 11/17/2020 8.6 - 10.4   Protein Total (gm/dL)  7.1 11/30/2021  6.1 - 8.1   Albumin Level (gm/dL)  4.6 11/30/2021  3.6 - 5.1   Globulin  2.5 11/30/2021  1.9 - 3.7   A/G Ratio  1.8 11/30/2021  1.0 - 2.5   Bilirubin Total (mg/dL)  0.4 11/30/2021  0.2 - 1.2   Alkaline Phosphatase (unit/L)  71 11/30/2021  37 - 153   AST/SGOT (unit/L)  21 11/30/2021  10 - 35   ALT/SGPT (unit/L)  27 11/30/2021  6 - 29   Hgb A1c ((H)) 5.8 11/30/2021  5.6 11/17/2020  - <5.7     Please contact my practice at [phone] if you have any questions or concerns.     Sincerely,        Jocelyn Lebron MD      What do your labs mean?  Below is a glossary of commonly ordered labs:  LDL   Bad Cholesterol   HDL   Good Cholesterol  AST/ALT   Liver Function   Cr/Creatinine   Kidney Function  Microalbumin   Kidney Function  BUN   Kidney Function  PSA   Prostate    TSH   Thyroid Hormone  HgbA1c   Diabetes Test   Hgb (Hemoglobin)   Red Blood Cells

## 2022-02-16 NOTE — NURSING NOTE
Medicare Visit Entered On:  11/17/2020 11:13 AM CST    Performed On:  11/17/2020 11:05 AM CST by Tiffany Vasquez CMA               Summary   Chief Complaint :   AWV. Preventive handout reviewed.   Menstrual Status :   Postmenopausal   Weight Measured :   139 lb(Converted to: 139 lb 0 oz, 63.049 kg)    Height/Length Estimated :   59.5 in(Converted to: 4 ft 11 in, 151.13 cm)    Systolic Blood Pressure :   110 mmHg   Diastolic Blood Pressure :   72 mmHg   Mean Arterial Pressure :   85 mmHg   Peripheral Pulse Rate :   74 bpm   BP Site :   Right arm   BP Method :   Manual   Temperature Tympanic :   97.9 DegF(Converted to: 36.6 DegC)    Oxygen Saturation :   98 %   Tiffany Vasquez CMA - 11/17/2020 11:05 AM CST   Health Status   Allergies Verified? :   Yes   Medication History Verified? :   Yes   Medical History Verified? :   Yes   Pre-Visit Planning Status :   Completed   Tobacco Use? :   Never smoker   Tiffany Vasquez CMA - 11/17/2020 11:05 AM CST   Consents   Consent for Immunization Exchange :   Consent Granted   Consent for Immunizations to Providers :   Consent Granted   Tiffany Vasquez CMA - 11/17/2020 11:05 AM CST   Meds / Allergies   (As Of: 11/17/2020 11:13:45 AM CST)   Allergies (Active)   Pneumovax 23  Estimated Onset Date:   Unspecified ; Reactions:   Localized swelling ; Created By:   Meghna Chin PA-C; Reaction Status:   Active ; Category:   Drug ; Substance:   Pneumovax 23 ; Type:   Side Effect ; Updated By:   Meghna Chin PA-C; Reviewed Date:   11/17/2020 11:09 AM CST        Medication List   (As Of: 11/17/2020 11:13:45 AM CST)   Prescription/Discharge Order    citalopram  :   citalopram ; Status:   Prescribed ; Ordered As Mnemonic:   citalopram 20 mg oral tablet ; Simple Display Line:   20 mg, 1 tab(s), Oral, daily, 90 tab(s), 0 Refill(s) ; Ordering Provider:   Jocelyn Lebron MD; Catalog Code:   citalopram ; Order Dt/Tm:   10/21/2020 3:03:41 PM CDT          citalopram  :   citalopram ;  Status:   Prescribed ; Ordered As Mnemonic:   citalopram 20 mg oral tablet ; Simple Display Line:   20 mg, 1 tab(s), PO, Daily, 90 tab(s), 2 Refill(s) ; Ordering Provider:   Jocelyn Lebron MD; Catalog Code:   citalopram ; Order Dt/Tm:   12/19/2019 9:24:08 AM CST          lisinopril  :   lisinopril ; Status:   Prescribed ; Ordered As Mnemonic:   lisinopril 10 mg oral tablet ; Simple Display Line:   See Instructions, TAKE 1 TABLET BY MOUTH EVERY DAY, 90 EA, 3 Refill(s) ; Ordering Provider:   Jocelyn Lebron MD; Catalog Code:   lisinopril ; Order Dt/Tm:   1/3/2020 8:25:59 PM CST          pimecrolimus topical  :   pimecrolimus topical ; Status:   Prescribed ; Ordered As Mnemonic:   pimecrolimus 1% topical cream ; Simple Display Line:   See Instructions, APPLY TO AFFECTED AREA TWICE A DAY, 30 gm, 0 Refill(s) ; Ordering Provider:   Jocelyn Lebron MD; Catalog Code:   pimecrolimus topical ; Order Dt/Tm:   10/7/2020 12:38:03 PM CDT          triamcinolone topical  :   triamcinolone topical ; Status:   Prescribed ; Ordered As Mnemonic:   triamcinolone 0.1% topical cream ; Simple Display Line:   1 kurtis, TOP, BID, 30 g, 2 Refill(s) ; Ordering Provider:   Jocelyn Lebron MD; Catalog Code:   triamcinolone topical ; Order Dt/Tm:   11/16/2018 10:04:16 AM CST            Home Meds    calcium-vitamin D  :   calcium-vitamin D ; Status:   Documented ; Ordered As Mnemonic:   Calcium 600+D ; Simple Display Line:   1 tab(s), po, daily ; Catalog Code:   calcium-vitamin D ; Order Dt/Tm:   4/26/2013 8:58:20 AM CDT          loratadine  :   loratadine ; Status:   Documented ; Ordered As Mnemonic:   loratadine ; Simple Display Line:   po, prn, 0 Refill(s) ; Catalog Code:   loratadine ; Order Dt/Tm:   4/12/2017 11:25:47 AM CDT          naproxen  :   naproxen ; Status:   Documented ; Ordered As Mnemonic:   Aleve 220 mg oral tablet ; Simple Display Line:   220 mg, 1 tab(s), po, daily ; Catalog Code:   naproxen ; Order Dt/Tm:   4/26/2013  8:58:44 AM CDT          polyethylene glycol 3350  :   polyethylene glycol 3350 ; Status:   Documented ; Ordered As Mnemonic:   MiraLax oral powder for reconstitution ; Simple Display Line:   17 gm, po, daily, 0 Refill(s) ; Catalog Code:   polyethylene glycol 3350 ; Order Dt/Tm:   10/4/2016 2:51:12 PM CDT            Social History   Social History   (As Of: 11/17/2020 11:13:45 AM CST)   Alcohol:  Past      Past, Recovering alcoholic; quit in 2002   (Last Updated: 12/23/2019 10:17:30 AM CST by Brinda Rhodes)          Tobacco:  Past      Former smoker, quit more than 30 days ago, Cigarettes, Previous treatment: Nicotine replacement.  Ready to change: Yes.   Comments:  11/16/2018 10:02 AM - Chey García: Quit in November, 1991.   (Last Updated: 12/23/2019 10:16:50 AM CST by Brinda Rhodes)          Electronic Cigarette/Vaping:        Electronic Cigarette Use: Never.   (Last Updated: 11/17/2020 11:05:40 AM CST by Tiffany Vasquez CMA)          Substance Abuse:        Past, Marijuana   Comments:  11/16/2018 10:03 AM - Chey García: Quit in January, 1992.   (Last Updated: 11/16/2018 10:03:15 AM CST by Chey García)          Employment/School:        Retired, 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  Highest education level: University degree(s).   (Last Updated: 11/16/2018 10:05:38 AM CST by Chey García)          Home/Environment:        Marital status: .  Spouse/Partner name: Desi Barajas children.  Living situation: Home/Independent.  Injuries/Abuse/Neglect in household: No.  Feels unsafe at home: No.  Family/Friends available for support: Yes.  Risks in environment: Stairs, owns secured gun.   (Last Updated: 12/23/2019 10:18:11 AM CST by Brinda Rhodes)          Nutrition/Health:        Type of diet: Regular.  Wants to lose weight: Yes.  Sleeping concerns: No.  Feels highly stressed: No.   (Last Updated: 12/23/2019 10:17:45 AM CST by Brinda Rhodes)          Exercise:  Occasional exercise       Comments:   11/16/2018 10:04 AM - Chey García: There times per week.  Body pump.  Treadmill.  Weights.   (Last Updated: 11/16/2018 10:04:42 AM CST by Chey García)          Sexual:        Sexually active: Yes.  Sexual orientation: Heterosexual.   (Last Updated: 1/18/2012 1:41:08 PM CST by Vandana Bro CMA)   Identifies as female   (Last Updated: 12/23/2019 10:15:51 AM CST by Brinda Rhodes)            Geriatric Depression Screening   Geriatric Depression Satisfied Life :   Yes   Geriatric Depression Dropped Activities :   No   Geriatric Depression Life Empty :   No   Geriatric Depression Bored :   No   Geriatric Depression Good Spirits :   Yes   Geriatric Depression Afraid Bad Things :   No   Geriatric Depression Feel Happy :   Yes   Geriatric Depression Feel Helpless :   No   Geriatric Depression Prefer to Stay Home :   Yes   Geriatric Depression Memory Problems :   No   Geriatric Depression Wonderful Be Alive :   Yes   Geriatric Depression Feel Worthless :   No   Geriatric Depression Situation Hopeless :   No   Geriatric Depression Others Better Off :   No   Geriatric Depression Full of Energy :   No   Geriatric Depression Total Score :   2    Tiffany Vasquez CMA - 11/17/2020 11:05 AM CST   Home Safety Screen   Emergency Numbers Kept/Updated :   Yes   Aware of Smoking Dangers :   Yes   Smoke Alarms/Fire Extinguisher Available :   Yes   Household Members Fire Safety Knowledge :   Yes   Firearms Unloaded and Secure :   Yes   Floor Rugs Removed or Fastened :   No   Mats in Bathtub/Shower :   No   Stairway Rails or Banisters :   Yes   Outdoor Clutter Safety :   Yes   Indoor Clutter Safety :   Yes   Electrical Cord Safety :   Yes   Tiffany Vasquez CMA - 11/17/2020 11:05 AM CST   Advance Directives   Advance Directive :   No   Tiffany Vasquez CMA - 11/17/2020 11:05 AM CST   Bhatti Fall Risk   History of Fall in Last 3 Months Bhatti :   No   Tiffany Vasquez CMA - 11/17/2020 11:05 AM CST   Functional Assessment   Focused Functional  Assessment Grid   Bathing :   Independent (2)   Dressing :   Independent (2)   Toileting :   Independent (2)   Transferring Bed or Chair :   Independent (2)   Continence :   Independent (2)   Feeding :   Independent (2)   Tiffany Vasquez CMA - 11/17/2020 11:05 AM CST   ADL Index Score :   12    Capable of Shopping :   Yes   Capable of Walking :   Yes   Capable of Housekeeping :   Yes   Capable of Managing Medications :   Yes   Capable of Handling Finances :   Yes   Tiffayn Vasquez CMA - 11/17/2020 11:05 AM CST

## 2022-02-16 NOTE — PROGRESS NOTES
Chief Complaint    AWV  History of Present Illness      The patient presents today for general exam.  ADL's and safety were reviewed.      1.  Hearing Impairment (hearing aids, symptoms, history):  No problems noted.      2.  Activities of Daily Living (hygiene, eating, cooking, cleaning, shopping, errands): No problems noted.      3.  Falls Risk (walking, transferring, cane, walker, aids):  No problems noted.      4.  Home Safety (surfaces, steps):  No problems noted.         Patient's PHQ9 and CAGE questionnaire reviewed and discussed with patient.b             The patient answers questions regarding year, date, and city she lives in appropriately.  No evidence of cognitive impairment is noted.      Patient has a remote history of tobacco abuse.  She has at least a 23-cgev-mwhs history.  She quit smoking a pack per day in 1991 but continued to smoke occasionally when she drank up until 2002.  He has been a non-smoker for 15 years.  Looking through both her [facility] medical chart and her [facility] medical chart I do not see any previous chest x-rays.  She reports that she does occasionally have a cough is a dry cough and she thinks that it is related to her lisinopril but does not want to change her lisinopril.  The cough does not really bother her.       She will be due for colon cancer screening after her March 3 of 2022.       Hyperlipidemia patient is fasting today       Osteopenia educated patient on calcium supplementation and provided her with information downloaded from the International osteoporosis foundation's website and encouraged her to also take a look at the website for additional information.  Due for DEXA scan.       Over the summer she was busy gardening and was eating healthier.  She is recently had a fall and developed some left lateral pain that is worse when laying on her side.       She gets an occasional rash.  She uses triamcinolone for this which works  well.  She indicates that it is usually on her hands.  She does need a refill of her triamcinolone.       prediabetes       bilateral hip pain for the [ast few months, lateral hips, difficult to lay on her sides, no specific injury,        I have reviewed the completed Health Risk Assessment and Health History forms with the patient and positives are noted.  We have agreed on the plan below for identified risk factors.  Please see copy of scanned forms.      Review of systems is negative except as per HPI including no fevers, chills, sore throat, runny nose, nausea, vomiting, constipation, diarrhea, rash or new skin lesions, chest pain, palpitations, slurred speech, new paresthesia, shortness of breath or wheeze.             Exam:      see vitals listed below      General: alert and oriented ×3 no acute distress.      HEENT: Normocephalic and atraumatic.       Eyes pupils are equal round and reactive to light extraocular motion is intact. normal conjunctiva      Hearing is grossly normal and there is no otorrhea. Tympanic membranes are pearly grey with a normal light reflex.      Nares are patent there is no rhinorrhea.       Mucous membranes are moist and pink.      Chest: has bilateral rise with no increased work of breathing. clear to auscultation without wheezes, rhonchi, or rales.      Cardiovascular: normal perfusion and brisk capillary refill. S1S2 with regular rate and rhythm and no murmurs, gallops or rubs.      Musculoskeletal: no gross focal abnormalities and normal gait.      Neuro: no gross focal abnormalities and memory seems intact.  CN 2-12 are grossly intact.      Psychiatric: speech is clear and coherent and fluent. Patient dressed appropriately for the weather. Mood is appropriate and affect is full.      Abd: no rebound or guarding, normal BS      Skin: no rash or lesion identified      Discussed:      using sunscreen, protecting from sunburn,      refer to usdachoosemyplate.gov, AHA and ADA for  diet and exercise recommendations      consume 3753-0335 mg calcium daily      regular self skin checks      get 150min /week of aerobic exercise  Physical Exam   Vitals & Measurements    HR: 68 (Peripheral)  RR: 16  BP: 104/60  SpO2: 98%     HT: 59.75 in  HT: 59.5 in  WT: 134.2 lb  BMI: 26.43   Assessment/Plan       Hyperlipidemia (E78.2)         We will recheck her fasting lipid panel today.  We will also get her LFTs done on her CMP in case we need to start a statin.  Encouraged therapeutic lifestyle changes.         Ordered:          Comprehensive Metabolic Panel* ([facility]), Specimen Type: Serum, Collection Date: 11/30/21 9:52:00 CST          Hemoglobin A1c* ([facility]), Specimen Type: Blood, Collection Date: 11/30/21 9:52:00 CST          Lipid panel with reflex to direct ldl* ([facility]), Specimen Type: Serum, Collection Date: 11/30/21 9:52:00 CST                Osteopenia (M85.80)         Referred patient to the International osteoporosis foundation website and provided her with some handouts from their website today.  Encouraged patient to do weightbearing exercises and to consider using a weight vest or backpack while doing her weightbearing exercises.  Ordered a repeat bone density study and encouraged patient to have adequate calcium and vitamin D supplementation.         Ordered:          DEXA Scan (Request), Osteopenia                Prediabetes (R73.03)         We will recheck fasting blood sugar as well as a hemoglobin A1c.  Encouraged continued therapeutic lifestyle changes.         Ordered:          Comprehensive Metabolic Panel* ([facility]), Specimen Type: Serum, Collection Date: 11/30/21 9:52:00 CST          Hemoglobin A1c* ([facility]), Specimen Type: Blood, Collection Date: 11/30/21 9:52:00 CST          Lipid panel with reflex to direct ldl* ([facility]), Specimen Type: Serum, Collection Date: 11/30/21 9:52:00 CST                Trochanteric bursitis, left hip (M70.62)         Suggested patient take an order for  physical therapy so that a month from now she does not feel that her symptoms have adequately improved she can go ahead and schedule an appointment with physical therapy without having to schedule an appointment with me to get an order for PT.  She was in agreement with this         Ordered:          Physical Therapy Evaluation and Treatment (Request), Trochanteric bursitis, left hip                Orders:         citalopram, = 1 tab(s) ( 20 mg ), Oral, daily, # 90 tab(s), 3 Refill(s), Type: Soft Stop, Pharmacy: [id number], 1 tab(s) Oral daily, 59.75, in, 11/30/21 9:21:00 CST, Height Measured, 134.2, lb, 11/30/21 9:21:00 CST, Weight Measured, (Ordered)         lisinopril, See Instructions, Instructions: TAKE 1 TABLET BY MOUTH EVERY DAY, # 90 tab(s), 3 Refill(s), Type: Soft Stop, Pharmacy: [id number], TAKE 1 TABLET BY MOUTH EVERY DAY, 59.75, in, 11/30/21 9:21:00 CST, Height Measured, 134.2, lb, 11/30/21 9:21:00..., (Ordered)         triamcinolone topical, 1 kurtis, TOP, BID, # 30 g, 2 Refill(s), Type: Maintenance, Pharmacy: [id number], 1 kurtis Topical bid, 59.75, in, 11/30/21 9:21:00 CST, Height Measured, 134.2, lb, 11/30/21 9:21:00 CST, Weight Measured, (Ordered)  Patient Information     Name:CAROLINA FORRESTERKI MISHEL      Address:      [address] [id number]     Sex:Female     YOB: 1955     [phone]     Emergency Contact:LASHAE FORRESTER     MRN:[id number]     FIN:[id number]     Location:[facility]     Date of Service:11/30/2021      Primary Care Physician:       Jocelyn Lebron MD, [phone]      Attending Physician:       Jocelyn Lebron MD, [phone]  Problem List/Past Medical History    Ongoing     Diverticulosis     Ex-smoker     Hot flashes     Hyperlipidemia     Menopause     Osteopenia     Third degree hemorrhoids    Historical     *Hospitalized@[facility] - Rectal prolapse     AA (alcohol abuse)     Glaucoma     Lyme disease      Pregnancy     Pregnancy     Pregnancy     Pregnancy  Procedure/Surgical History     Mesh rectopexy (2016)      Comments: Rigid proctoscopy..     THD - Transanal hemorroidal dearterialization (2016)     Cardiac computed tomography for calcium scoring (2015)      Comments: Total Agatston calcium score is 5.     Colonoscopy (2011)      Comments: Normal; repeat 2021.     Sigmoidoscopy (2005)     Laser surgery (2000)      Comments: on R eye for Glaucoma.     Therapeutic  (1994)      section ()  Medications    Aleve 220 mg oral tablet, 220 mg= 1 tab(s), Oral, daily    Calcium 600+D, 1 tab(s), Oral, daily    citalopram 20 mg oral tablet, 20 mg= 1 tab(s), Oral, daily, 3 refills    lisinopril 10 mg oral tablet, See Instructions, 3 refills    loratadine, Oral, prn    MiraLax oral powder for reconstitution, 17 gm, Oral, daily    pimecrolimus 1% topical cream, See Instructions    triamcinolone 0.1% topical cream, 1 kurtis, Topical, bid, 2 refills  Allergies    Pneumovax 23 (Localized swelling)  Social History    Smoking Status     Former smoker     Alcohol - Past      Past, Recovering alcoholic; quit in      Electronic Cigarette/Vaping      Electronic Cigarette Use: Never.     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 Highest education level: University degree(s).     Exercise - Occasional exercise      Exercise frequency: 3-4 times/week. Exercise type: Walking.     Home/Environment      Marital status: . Spouse/Partner name: Desi Barajas children. Living situation: Home/Independent. Injuries/Abuse/Neglect in household: No. Feels unsafe at home: No. Family/Friends available for support: Yes. Risks in environment: Stairs, owns secured gun.     Nutrition/Health      Type of diet: Regular. Wants to lose weight: Yes. Sleeping concerns: No. Feels highly stressed: No.     Sexual      Identifies as female     Substance Abuse       Past, Marijuana     Tobacco - Past      Former smoker, quit more than 30 days ago, Cigarettes, Previous treatment: Nicotine replacement. Ready to change: Yes.  Family History    Alcoholism: Father.    Allergic Rhinitis: Mother.    Allergic rhinitis: Sister.    Alzheimer's disease: Mother.    Arthritis: Mother.    Breast cancer: Mother (Dx at 75) and Cousin (M).    Crohn disease: Brother.    Depression: Mother.    Diabetes mellitus: Sister.    Hypercholesterolemia: Mother.    Melanoma: Mother.    Prostate cancer.: Father.    Daughter: History is negative    Son: History is negative    Son: History is negative  Immunizations       Scheduled Immunizations       Dose Date(s)       influenza virus vaccine, inactivated       11/11/2013, 11/23/2021       SARS-CoV-2 (COVID-19) Moderna-1273       02/18/2021, 03/18/2021, 11/23/2021       zoster vaccine, inactivated       10/22/2020, 12/22/2020       Other Immunizations               influenza       11/16/2018       influenza virus vaccine, inactivated       12/01/2010, 08/31/2015, 10/04/2016, 11/14/2017, 11/16/2018, 12/19/2019, 10/22/2020       hepatitis A adult vaccine       11/17/2020       pneumococcal (PPSV23)       12/19/2019       Td       04/01/1981, 03/10/1993, 04/04/2003       tetanus/diphth/pertuss (Tdap) adult/adol       08/18/2014       influenza, H1N1, inactivated       12/15/2009       pneumococcal (PCV13)       11/16/2018

## 2022-02-16 NOTE — LETTER
(Inserted Image. Unable to display)       January 28, 2019      TERRY FORRESTER   [address], [place] [id number]        Dear TERRY,     Thank you for selecting [facility] for your healthcare needs. Below you will find the results of your recent test(s) done at our clinic.     Your results are normal!  Please come back for a follow up appointment if you are still having symptoms.       Result Name Current Result Previous Result Reference Range   Sodium Level (mmol/L)  137 1/24/2019  140 11/16/2018 135 - 146   Potassium Level (mmol/L)  4.2 1/24/2019  4.3 11/16/2018 3.5 - 5.3   Chloride Level (mmol/L)  103 1/24/2019  105 11/16/2018 98 - 110   CO2 Level (mmol/L)  25 1/24/2019  29 11/16/2018 20 - 32   Glucose Level (mg/dL)  84 1/24/2019  85 11/16/2018 65 - 99   BUN (mg/dL)  22 1/24/2019  21 11/16/2018 7 - 25   Creatinine Level (mg/dL)  0.98 1/24/2019  0.73 11/16/2018 0.50 - 0.99   BUN/Creat Ratio  NOT APPLICABLE 1/24/2019  NOT APPLICABLE 11/16/2018 6 - 22   eGFR (mL/min/1.73m2)  61 1/24/2019  86 11/16/2018 > OR = 60 -    eGFR  (mL/min/1.73m2)  70 1/24/2019  100 11/16/2018 > OR = 60 -    Calcium Level (mg/dL)  9.8 1/24/2019  10.1 11/16/2018 8.6 - 10.4     Please contact my practice at [phone] if you have any questions or concerns.     Sincerely,        Jocelyn Lebron MD      What do your labs mean?  Below is a glossary of commonly ordered labs:  LDL   Bad Cholesterol   HDL   Good Cholesterol  AST/ALT   Liver Function   Cr/Creatinine   Kidney Function  Microalbumin   Kidney Function  BUN   Kidney Function  PSA   Prostate    TSH   Thyroid Hormone  HgbA1c   Diabetes Test   Hgb (Hemoglobin)   Red Blood Cells

## 2022-02-16 NOTE — NURSING NOTE
Quick Intake Entered On:  1/22/2019 11:22 AM CST    Performed On:  1/22/2019 11:21 AM CST by Mely Durán RN               Summary   Chief Complaint :   BP   Menstrual Status :   Postmenopausal   Systolic Blood Pressure :   110 mmHg   Diastolic Blood Pressure :   66 mmHg   Mean Arterial Pressure :   81 mmHg   Peripheral Pulse Rate :   76 bpm   BP Site :   Right arm   Pulse Site :   Radial artery   BP Method :   Manual   HR Method :   Manual   Mely Durán RN - 1/22/2019 11:21 AM CST

## 2022-02-16 NOTE — NURSING NOTE
Medicare Visit Entered On:  11/30/2021 9:32 AM CST    Performed On:  11/30/2021 9:21 AM CST by Batool العراقي               Summary   Chief Complaint :   AWV   Advance Directive :   No   Menstrual Status :   Postmenopausal   Weight Measured :   134.2 lb(Converted to: 134 lb 3 oz, 60.872 kg)    Height Measured :   59.75 in(Converted to: 5 ft 0 in, 151.76 cm)    Body Mass Index :   26.43 kg/m2 (HI)    Body Surface Area :   1.6 m2   Height/Length Estimated :   59.5 in(Converted to: 4 ft 11 in, 151.13 cm)    Systolic Blood Pressure :   104 mmHg   Diastolic Blood Pressure :   60 mmHg   Mean Arterial Pressure :   75 mmHg   Peripheral Pulse Rate :   68 bpm   BP Site :   Right arm   BP Method :   Manual   Respiratory Rate :   16 br/min   Oxygen Saturation :   98 %   Batool العراقي - 11/30/2021 9:21 AM CST   Health Status   Allergies Verified? :   Yes   Medication History Verified? :   Yes   Pre-Visit Planning Status :   Completed   Tobacco Use? :   Former smoker   Batool العراقي - 11/30/2021 9:21 AM CST   Consents   Consent for Immunization Exchange :   Consent Granted   Consent for Immunizations to Providers :   Consent Granted   Batool العراقي - 11/30/2021 9:21 AM CST   Meds / Allergies   (As Of: 11/30/2021 9:32:04 AM CST)   Allergies (Active)   Pneumovax 23  Estimated Onset Date:   Unspecified ; Reactions:   Localized swelling ; Created By:   Meghna Chin PA-C; Reaction Status:   Active ; Category:   Drug ; Substance:   Pneumovax 23 ; Type:   Side Effect ; Updated By:   Meghna Chin PA-C; Reviewed Date:   11/17/2020 11:09 AM CST        Medication List   (As Of: 11/30/2021 9:32:04 AM CST)   Prescription/Discharge Order    citalopram  :   citalopram ; Status:   Prescribed ; Ordered As Mnemonic:   citalopram 20 mg oral tablet ; Simple Display Line:   20 mg, 1 tab(s), Oral, daily, 90 tab(s), 3 Refill(s) ; Ordering Provider:   Jocelyn Lebron MD; Catalog Code:   citalopram ; Order Dt/Tm:   11/17/2020  11:35:45 AM CST          lisinopril  :   lisinopril ; Status:   Prescribed ; Ordered As Mnemonic:   lisinopril 10 mg oral tablet ; Simple Display Line:   See Instructions, TAKE 1 TABLET BY MOUTH EVERY DAY, 30 tab(s), 0 Refill(s) ; Ordering Provider:   Jocelyn Lebron MD; Catalog Code:   lisinopril ; Order Dt/Tm:   11/22/2021 9:29:19 AM CST          pimecrolimus topical  :   pimecrolimus topical ; Status:   Prescribed ; Ordered As Mnemonic:   pimecrolimus 1% topical cream ; Simple Display Line:   See Instructions, APPLY TO AFFECTED AREA TWICE A DAY, 30 gm, 0 Refill(s) ; Ordering Provider:   Jocelyn Lebron MD; Catalog Code:   pimecrolimus topical ; Order Dt/Tm:   10/7/2020 12:38:03 PM CDT          triamcinolone topical  :   triamcinolone topical ; Status:   Prescribed ; Ordered As Mnemonic:   triamcinolone 0.1% topical cream ; Simple Display Line:   1 kurtis, TOP, BID, 30 g, 2 Refill(s) ; Ordering Provider:   Jocelyn Lebron MD; Catalog Code:   triamcinolone topical ; Order Dt/Tm:   11/17/2020 11:34:51 AM CST            Home Meds    calcium-vitamin D  :   calcium-vitamin D ; Status:   Documented ; Ordered As Mnemonic:   Calcium 600+D ; Simple Display Line:   1 tab(s), po, daily ; Catalog Code:   calcium-vitamin D ; Order Dt/Tm:   4/26/2013 8:58:20 AM CDT          loratadine  :   loratadine ; Status:   Documented ; Ordered As Mnemonic:   loratadine ; Simple Display Line:   po, prn, 0 Refill(s) ; Catalog Code:   loratadine ; Order Dt/Tm:   4/12/2017 11:25:47 AM CDT          naproxen  :   naproxen ; Status:   Documented ; Ordered As Mnemonic:   Aleve 220 mg oral tablet ; Simple Display Line:   220 mg, 1 tab(s), po, daily ; Catalog Code:   naproxen ; Order Dt/Tm:   4/26/2013 8:58:44 AM CDT          polyethylene glycol 3350  :   polyethylene glycol 3350 ; Status:   Documented ; Ordered As Mnemonic:   MiraLax oral powder for reconstitution ; Simple Display Line:   17 gm, po, daily, 0 Refill(s) ; Catalog Code:    polyethylene glycol 3350 ; Order Dt/Tm:   10/4/2016 2:51:12 PM CDT            Social History   Social History   (As Of: 11/30/2021 9:32:04 AM CST)   Alcohol:  Past      Past, Recovering alcoholic; quit in 2002   (Last Updated: 12/23/2019 10:17:30 AM CST by Brinda Rhodes)          Tobacco:  Past      Former smoker, quit more than 30 days ago, Cigarettes, Previous treatment: Nicotine replacement.  Ready to change: Yes.   Comments:  11/16/2018 10:02 AM - Chey García: Quit in November, 1991.   (Last Updated: 11/30/2021 9:21:45 AM CST by Batool العراقي)          Electronic Cigarette/Vaping:        Electronic Cigarette Use: Never.   (Last Updated: 11/30/2021 9:21:47 AM CST by Batool العراقي)          Substance Abuse:        Past, Marijuana   Comments:  11/16/2018 10:03 AM - Chey García: Quit in January, 1992.   (Last Updated: 11/16/2018 10:03:15 AM CST by Chey García)          Employment/School:        Retired, 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  Highest education level: University degree(s).   (Last Updated: 11/16/2018 10:05:38 AM CST by Chey García)          Home/Environment:        Marital status: .  Spouse/Partner name: Mino.  Karl children.  Living situation: Home/Independent.  Injuries/Abuse/Neglect in household: No.  Feels unsafe at home: No.  Family/Friends available for support: Yes.  Risks in environment: Stairs, owns secured gun.   (Last Updated: 12/23/2019 10:18:11 AM CST by Brinda Rhodes)          Nutrition/Health:        Type of diet: Regular.  Wants to lose weight: Yes.  Sleeping concerns: No.  Feels highly stressed: No.   (Last Updated: 12/23/2019 10:17:45 AM CST by Brinda Rhodes)          Exercise:  Occasional exercise      Exercise frequency: 3-4 times/week.  Exercise type: Walking.   (Last Updated: 11/18/2020 2:54:43 PM CST by Moni Cazares)          Sexual:        Sexually active: Yes.  Sexual orientation: Heterosexual.   (Last Updated: 1/18/2012 1:41:08 PM CST by Dieudonne GAGE  Vandana)   Identifies as female   (Last Updated: 12/23/2019 10:15:51 AM CST by Brinda Rhodes)            Geriatric Depression Screening   Geriatric Depression Satisfied Life :   Yes   Geriatric Depression Dropped Activities :   No   Geriatric Depression Life Empty :   No   Geriatric Depression Bored :   No   Geriatric Depression Good Spirits :   Yes   Geriatric Depression Afraid Bad Things :   No   Geriatric Depression Feel Happy :   Yes   Geriatric Depression Feel Helpless :   No   Geriatric Depression Prefer to Stay Home :   No   Geriatric Depression Memory Problems :   No   Geriatric Depression Wonderful Be Alive :   Yes   Geriatric Depression Feel Worthless :   No   Geriatric Depression Situation Hopeless :   No   Geriatric Depression Others Better Off :   No   Geriatric Depression Full of Energy :   No   Geriatric Depression Total Score :   1    Batool العراقي 11/30/2021 9:21 AM CST   Hearing and Vision Screening   Audiogram Result Right Ear :   Pass   Audiogram Result Left Ear :   Pass   Batool العراقي 11/30/2021 9:21 AM CST   Home Safety Screen   Emergency Numbers Kept/Updated :   Yes   Aware of Smoking Dangers :   Yes   Smoke Alarms/Fire Extinguisher Available :   Yes   Household Members Fire Safety Knowledge :   Yes   Firearms Unloaded and Secure :   Yes   Floor Rugs Removed or Fastened :   No   Mats in Bathtub/Shower :   No   Outdoor Clutter Safety :   Yes   Indoor Clutter Safety :   Yes   Electrical Cord Safety :   Yes   Batool العراقي 11/30/2021 9:21 AM CST   Advance Directives   Advance Directive :   No   Batool العراقي 11/30/2021 9:21 AM CST   Bhatti Fall Risk   History of Fall in Last 3 Months Bhatti :   No   Batool العراقي 11/30/2021 9:21 AM CST   Functional Assessment   Focused Functional Assessment Grid   Bathing :   Independent (2)   Dressing :   Independent (2)   Toileting :   Independent (2)   Transferring Bed or Chair :   Independent (2)   Continence :   Independent (2)   Feeding :    Independent (2)   Batool العراقي - 11/30/2021 9:21 AM CST   ADL Index Score :   12    Capable of Shopping :   Yes   Capable of Walking :   Yes   Capable of Housekeeping :   Yes   Capable of Managing Medications :   Yes   Capable of Handling Finances :   Yes   Batool العراقي - 11/30/2021 9:21 AM CST

## 2022-02-16 NOTE — TELEPHONE ENCOUNTER
---------------------  From: Rose Montesinos CMA (Phone Messages Pool ([id number]))   To: Phone Messages Pool ([id number]_[place]);     Sent: 3/11/2019 3:20:32 PM CDT  Subject: Phone Note: confirm pharmacy     Phone Message    PCP:   RENEE      Time of Call:  2:58 pm       Person Calling:  [facility]  Phone number:  [phone]    Returned call at: 3:10 pm    Note:   Received a call from [facility] requesting refill of the citalopram 20mg. This was sent 11/16/18 #90, 3 refills to [facility], but also states faxed to [facility]. I LM with patient asking her to call back to confirm if she is wanting a pharmacy switch.     Pharmacy: waiting for call back from patient to confirm    Last office visit and reason:  12/18/18; office visit notePatient calls back - states she would like rx transferred to [facility] mail order. This was done. Also states she is about out of medications and won't get new supply from mail order before out. Requesting 1 month supply be sent to [facility]. This was done. Patient notified.

## 2022-02-16 NOTE — TELEPHONE ENCOUNTER
---------------------  From: Rosie Robles CMA (Phone Messages Pool ([id number]))   To: [employer] Message Pool ([id number]);     Sent: 12/5/2019 4:24:31 PM CST  Subject: General Message     Patient called at 3:49    Catie has an appointment scheduled for 12/19/19.  She is asking if any labs are needed before her visit.    Please advise    [phone]  ok to leave a message---------------------  From: Maryanne Lima CMA ([employer] Message Pool ([id number]_[facility]))   To: Jocelyn Lebron MD;     Sent: 12/6/2019 7:55:03 AM CST  Subject: FW: General Message---------------------  From: Jocelyn Lebron MD   To: [employer] Message Pool ([id number]_[place]);     Sent: 12/6/2019 11:40:44 AM CST  Subject: RE: General Message     please have pt rtc for fasting labs lipid panel, bmp and hgba1c 1 week prior to appt if possibleLDVM for patient at 1303. RTC placed.

## 2022-02-16 NOTE — PROGRESS NOTES
Chief Complaint    c/o rash on chin and around nose present for 2 months.  History of Present Illness      rash started about 2 months ago      has not tried anything for it      no recent trips, no changes in laundry products or cleaning products,   no  new pets,   no contacts with people with similar condition,  no new lotions or creams,   no recent camping trips      it itches occasionally      it is  not spreading locally or generalized      no fevers, chills, sore throat, runny nose, nausea, vomiting, constipation, no new skin lesions, chest pain, palpitations, slurred speech, new paresthesia, shortness of breath or wheeze.      Review of systems is negative except as per HPI      Exam:      General: alert and oriented ×3 no acute distress.      HEENT: pupils are equal round and reactive to light extraocular motion is intact. Normocephalic and atraumatic.       Hearing is grossly normal and there is no otorrhea.       Nares are patent there is no rhinorrhea.       Mucous membranes are moist and pink.      Chest: has bilateral rise with no increased work of breathing.      Cardiovascular: normal perfusion and brisk capillary refill.      Musculoskeletal: no gross focal abnormalities and normal gait.      Neuro: no gross focal abnormalities and memory seems intact.      Psychiatric: speech is clear and coherent and fluent. Patient dressed appropriately for the weather. Mood is appropriate and affect is full.      Skin: skin on vermillion border is pared, redness with scant scaling on chin and near nasal folds, no crusting or vessicles      Education:  discussed with patient diagnosis.    also see assessment and plan below.   Patient should return to clinic if symptoms worsen or do not improve, and as needed for next annual exam.   Physical Exam   Vitals & Measurements    T: 97.0   F (Tympanic)  HR: 81(Peripheral)  BP: 102/60  SpO2: 99%     WT: 138.8 lb   Assessment/Plan       Periorbital dermatitis (L30.9)          Ordered:          pimecrolimus topical, 1 kurtis, Topical, bid, # 30 gm, 0 Refill(s), Type: Maintenance, Pharmacy: [id number], 1 kurtis Topical bid, (Ordered)          Referral (Request), 20 13:11:00 CST, Referred to: Dermatology, Periorbital dermatitis           Patient Information     Name:TERRY FORRESTER      Address:      [address] [id number]     Sex:Female     YOB: 1955     [phone]     Emergency Contact:LASHAE FORRESTER     MRN:[id number]     FIN:[id number]     Location:[facility]     Date of Service:2020      Primary Care Physician:       Jocelyn Lebron MD, [phone]      Attending Physician:       Jocelyn Lebron MD, [phone]  Problem List/Past Medical History    Ongoing     Diverticulosis     Ex-smoker     Hyperlipidemia     Menopause     Osteopenia     Third degree hemorrhoids    Historical     *Hospitalized@[facility] - Rectal prolapse     AA (alcohol abuse)     Glaucoma     Lyme disease     Pregnancy     Pregnancy     Pregnancy     Pregnancy  Procedure/Surgical History     Mesh rectopexy (2016)            Comments: Rigid proctoscopy..     THD - Transanal hemorroidal dearterialization (2016)           Cardiac computed tomography for calcium scoring (2015)            Comments: Total Agatston calcium score is 5.     Colonoscopy (2011)            Comments: Normal; repeat 2021.     Sigmoidoscopy (2005)           Laser surgery ()            Comments: on R eye for Glaucoma.     Therapeutic  ()            section ()        Medications    Aleve 220 mg oral tablet, 220 mg= 1 tab(s), Oral, daily    Calcium 600+D, 1 tab(s), Oral, daily    citalopram 20 mg oral tablet, See Instructions    citalopram 20 mg oral tablet, 20 mg= 1 tab(s), Oral, daily, 2 refills    lisinopril 10 mg oral tablet, See Instructions, 3 refills    loratadine, Oral, prn    MiraLax  oral powder for reconstitution, 17 gm, Oral, daily    pimecrolimus 1% topical cream, 1 kurtis, Topical, bid    triamcinolone 0.1% topical cream, 1 kurtis, Topical, bid, 2 refills  Allergies    Pneumovax 23 (Localized swelling)  Social History    Smoking Status - 01/08/2020     Never smoker     Alcohol - Past, 12/23/2019      Past, Recovering alcoholic; quit in 2002, 12/23/2019     Employment/School      Retired, 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 Highest education level: University degree(s)., 11/16/2018     Exercise - Occasional exercise, 12/23/2019     Home/Environment      Marital status: . Spouse/Partner name: Desi Barajas children. Living situation: Home/Independent. Injuries/Abuse/Neglect in household: No. Feels unsafe at home: No. Family/Friends available for support: Yes. Risks in environment: Stairs, owns secured gun., 12/23/2019     Nutrition/Health      Type of diet: Regular. Wants to lose weight: Yes. Sleeping concerns: No. Feels highly stressed: No., 12/23/2019     Sexual      Identifies as female, 12/23/2019      Sexually active: Yes. Sexual orientation: Heterosexual., 01/18/2012     Substance Abuse      Past, Marijuana, 11/16/2018     Tobacco - Past, 12/23/2019      Former smoker, quit more than 30 days ago, Cigarettes, Previous treatment: Nicotine replacement. Ready to change: Yes., 12/23/2019  Family History    Alcoholism: Father.    Allergic Rhinitis: Mother.    Allergic rhinitis: Mother and Sister.    Arthritis: Mother.    Breast cancer: Mother (Dx at 75) and Cousin (M).    Crohn disease: Brother.    Depression: Mother.    Hypercholesterolemia: Mother.    Melanoma: Mother.    Prostate cancer.: Father.    Daughter: History is negative    Son: History is negative    Son: History is negative  Immunizations      Vaccine Date Status          pneumococcal (PPSV23) 12/19/2019 Given          influenza virus vaccine, inactivated 12/19/2019 Given          pneumococcal (PCV13) 11/16/2018 Given           influenza virus vaccine, inactivated 11/16/2018 Given          influenza 11/16/2018 Recorded          influenza virus vaccine, inactivated 11/14/2017 Given          influenza virus vaccine, inactivated 10/04/2016 Given          influenza virus vaccine, inactivated 08/31/2015 Given          tetanus/diphth/pertuss (Tdap) adult/adol 08/18/2014 Given          influenza virus vaccine, inactivated 12/01/2010 Given          influenza, H1N1, inactivated 12/15/2009 Recorded          Td 04/04/2003 Recorded          Td 03/10/1993 Recorded          Td 04/01/1981 Recorded  Lab Results       Lab Results (Last 4 results within 90 days)        Sodium Level: 139 mmol/L [135 mmol/L - 146 mmol/L] (12/10/19 09:33:00)       Potassium Level: 4.3 mmol/L [3.5 mmol/L - 5.3 mmol/L] (12/10/19 09:33:00)       Chloride Level: 106 mmol/L [98 mmol/L - 110 mmol/L] (12/10/19 09:33:00)       CO2 Level: 26 mmol/L [20 mmol/L - 32 mmol/L] (12/10/19 09:33:00)       Glucose Level: 98 mg/dL [65 mg/dL - 99 mg/dL] (12/10/19 09:33:00)       BUN: 25 mg/dL [7 mg/dL - 25 mg/dL] (12/10/19 09:33:00)       Creatinine Level: 0.81 mg/dL [0.5 mg/dL - 0.99 mg/dL] (12/10/19 09:33:00)       BUN/Creat Ratio: NOT APPLICABLE [6  - 22] (12/10/19 09:33:00)       eGFR: 76 mL/min/1.73m2 (12/10/19 09:33:00)       eGFR : 88 mL/min/1.73m2 (12/10/19 09:33:00)       Calcium Level: 10 mg/dL [8.6 mg/dL - 10.4 mg/dL] (12/10/19 09:33:00)       Hgb A1c: 5.7 High (12/10/19 09:33:00)       Cholesterol: 240 mg/dL High (12/10/19 09:33:00)       Non-HDL Cholesterol: 181 High (12/10/19 09:33:00)       HDL: 59 mg/dL (12/10/19 09:33:00)       Cholesterol/HDL Ratio: 4.1 (12/10/19 09:33:00)       LDL: 164 High (12/10/19 09:33:00)       Triglyceride: 71 mg/dL (12/10/19 09:33:00)

## 2022-02-16 NOTE — TELEPHONE ENCOUNTER
---------------------  From: Christina GAGE Tiffany   Sent: 11/17/2020 12:04:30 PM CST  Subject: FOBT/Cologuard     Per [facility], patient will contact insurance to verify which colon cancer screen is covered. Cologuard order signed by patient prior to leaving and FOBT card given. Patient will call and inform CSS if needing Cologuard order faxed. [facility] [place] [facility] in [address].Forms given to [facility] CSS to hold.

## 2022-02-16 NOTE — PROGRESS NOTES
Patient:   TERRY FORRESTER            MRN: [id number]            FIN: [id number]               Age:   61 years     Sex:  Female     :  1955   Associated Diagnoses:   None   Author:   Derek Hernández MD      Right shoulder pain     Patient:   TERRY FORRESTER            MRN: [id number]            FIN: [id number]               Age:   61 years     Sex:  Female     :  1955   Associated Diagnoses:   None   Author:   Jose Elias Garcia CMA      Visit Information      Date of Service: 2017 11:17 am  Performing Location: [facility]  Encounter#: [id number]      Primary Care Provider (PCP):  [id number] -UNKNOWN,      Chief Complaint   2017 11:19 AM CDT   Pt presents today to address her pain located in her right shoulder off and on x 1 yr. She was a  and has retired 8 days ago. When she rests her shoulder for 1 week it feels better but she likes to stay active. Pain is throbbing sharp pain.        History of Present Illness             The patient presents with shoulder problem, and to address her pain located in her right shoulder off and on x 1 year. She was a  and has retired 8 days ago. The past week she has been doing a lot of yard work which is causing her shoulder to be painful. Denies any historical or recent trauma to left shoulder. When she rests her shoulder for 1 week it feels better but she likes to stay active. Pain is throbbing sharp pain that does not keep her up at night. The pain does not radiate into her arm. States she does have H/O bursitis in her elbow and hip. She takes Aleve daily to help with her pain which helps. To abduct her right arm laterally hurts mildly.  The location of the shoulder problem is the right shoulder.  The shoulder problem is characterized by pain.  The severity of the shoulder problem is moderate.  The timing/course of the symptom(s) related to the shoulder problem is episodic and fluctuates in intensity.  The shoulder problem  occurred 1 year.  Radiation of pain: none.  The context of the shoulder problem: occurred in association with repetitive motion.  Exacerbating factors consist of lifting and movement.  Relieving factors consist of analgesics, immobilization and rest.  Associated symptoms consist of none.  Prior treatment consists of none.        Review of Systems   Constitutional:  Decreased activity, No weakness.    Eye:  Negative.    Ear/Nose/Mouth/Throat:  Negative.    Respiratory:  Negative, No shortness of breath, No cough.    Cardiovascular:  Negative, No chest pain.    Hematology/Lymphatics:  Negative.    Endocrine:  Negative.    Immunologic:  Negative.    Musculoskeletal:  right shoulder pain, No back pain, No neck pain, No decreased range of motion, No trauma.    Integumentary:  Negative, No rash, No abrasions, No breakdown, No skin lesion.    Neurologic:  Alert and oriented X4.    Psychiatric:  Negative, No anxiety, No depression.             Health Status   Allergies:    Allergic Reactions (Selected)  No Known Medication Allergies   Medications:  (Selected)   Prescriptions  Prescribed  citalopram 20 mg oral tablet: 1 tab(s) ( 20 mg ), PO, Daily, # 90 tab(s), 3 Refill(s), Type: Maintenance, Pharmacy: [facility], Appointment scheduled, 1 tab(s) po daily  triamcinolone 0.1% topical cream: 1 kurtis, TOP, BID, # 30 g, 2 Refill(s), Type: Maintenance, Pharmacy: [facility], 1 kurtis top bid  Documented Medications  Documented  Aleve 220 mg oral tablet: 1 tab(s) ( 220 mg ), po, daily, 0 Refill(s), Type: Maintenance  Calcium 600+D: 1 tab(s), po, daily, 0 Refill(s), Type: Maintenance  MiraLax oral powder for reconstitution: ( 17 gm ), po, daily, 0 Refill(s), Type: Maintenance  loratadine: po, prn, 0 Refill(s), Type: Maintenance   Problem list:    All Problems (Selected)  Diverticulosis / SNOMED CT 3754244235 / Confirmed  Ex-Smoker / ICD-9-CM V15.82 / Confirmed  Menopause / ICD-9-.2 /  Confirmed  Third degree hemorrhoids / SNOMED CT 521592018 / Confirmed      Histories   Past Medical History:    Active  Diverticulosis (3170672229): Onset on 2011 at 55 years.  Menopause (627.2): Onset in  at 51 years.  Ex-Smoker (V15.82)  Resolved  *[facility]ospitalized[facility] - Rectal prolapse: Onset on 2016 at 61 years.  Resolved on 2016 at 61 years.  LYME DISEASE (088.81): Onset in the month of 2009 at 54 years  Resolved.  AA (Alcohol Abuse) (305.00):  Resolved.  Glaucoma (98048288):  Resolved.  Pregnancy (069282454):  Resolved on 1982 at 27 years.  Pregnancy (263862290):  Resolved on 3/1/1984 at 28 years.  Pregnancy (555905265):  Resolved on 10/1/1987 at 32 years.  Pregnancy (447587883):  Resolved on 1990 at 34 years.   Family History:    Breast cancer  Cousin (M)  Mother: onset at 75 .  Hypercholesterolemia  Mother  Depression  Mother  Crohn disease  Brother  Prostate cancer.  Father ()     Procedure history:    Mesh rectopexy (081812498) on 2016 at 61 Years.  Comments:  2016 8:17 AM - Brinda Rhodes  Rigid proctoscopy.  THD - Transanal hemorroidal dearterialization on 2016 at 60 Years.  Cardiac computed tomography for calcium scoring (0830034592) on 2015 at 60 Years.  Comments:  10/23/2015 11:46 AM - Ashtyn Cid  Total Agatston calcium score is 5  Colonoscopy (026818777) on 2011 at 55 Years.  Comments:  2011 11:05 AM - Abdifatah MONAHAN, Derek  Normal; repeat 2021  Sigmoidoscopy (59726604) on 2005 at 50 Years.  Laser surgery (86670184) in the month of 2000 at 44 Years.  Comments:  2010 9:18 AM - Renetta Jensen  on R eye for Glaucoma  Therapeutic  (72728194) in the month of 1994 at 38 Years.   section (23492966) in  at 27 Years.   Social History:        Alcohol Assessment: Past            Past                     Comments:                      2010 - Renetta Jensen                     Recovering alcoholic       Tobacco Assessment: Past            Past, Cigarettes                     Comments:                      11/29/2010 - Renetta Jensen                     Quit in 1990      Substance Abuse Assessment: Past            Past, Marijuana      Employment and Education Assessment            Employed, 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      Home and Environment Assessment            Marital status: .  Spouse/Partner name: Mino.  3 children.      Nutrition and Health Assessment            Type of diet: Regular.      Exercise and Physical Activity Assessment: Does not exercise            Exercise frequency: Never.      Sexual Assessment            Sexually active: Yes.  Sexual orientation: Heterosexual.      Other Assessment            Marital status,         Physical Examination   Vital Signs   4/12/2017 11:19 AM CDT Temperature Tympanic 98.2 DegF    Peripheral Pulse Rate 69 bpm    HR Method Electronic    Systolic Blood Pressure 136 mmHg    Diastolic Blood Pressure 76 mmHg    Mean Arterial Pressure 96 mmHg    BP Site Left arm    BP Method Manual    Oxygen Saturation 98 %      Measurements from flowsheet : Measurements   4/12/2017 11:19 AM CDT Height Measured - Standard 60 in    Weight Measured - Standard 133 lb    BSA 1.6 m2    Body Mass Index 25.97 kg/m2      General:  Alert and oriented, No acute distress.    Eye:  Pupils are equal, round and reactive to light, Normal conjunctiva.    HENT:  Normocephalic, Normal hearing.    Neck:  Supple, Non-tender.    Respiratory:  Lungs are clear to auscultation, Respirations are non-labored.    Cardiovascular:  Normal rate, Regular rhythm.    Musculoskeletal:  Normal range of motion, Normal strength, No swelling, No deformity, Normal gait, right shoulder tenderness.    Integumentary:  Warm, Dry, No rash.    Neurologic:  Alert, Oriented, Normal sensory.    Psychiatric:  Cooperative, Appropriate mood & affect, Normal judgment.       Impression and Plan   Plan:   ROM in right shoulder is good.    Women in 50's and 60's and frozen shoulder discussed and ruled out.    Rotator cuff tests showed rotator cuff issues are ruled out.    Issues: (1) biceps tendonitis in tight arm from lifting or reaching and repetitive motion, (2) right shoulder has tendency to be forward a little bit which is seen with unequal strength in both shoulders.    Right shoulder pain is associated to biceps tendonitis.    Treatment is anti-inflammatory with Aleve or ibuprofen, ice message with a frozen paper cup of water, continue to use shoulder and do easy/non stressful ROM exercises to help prevent frozen shoulder, advise against repetitive movement, use mechanical advantages, carry objects close to body.    Passive ROM exercises shown and discussed.    If not better in a couple to few weeks then steroid injection discussed. Risks of working hard right after injection discussed.    Patient Instructions:       Counseled: Patient, Regarding diagnosis, Regarding medications, Activity, Verbalized understanding.

## 2022-02-16 NOTE — TELEPHONE ENCOUNTER
---------------------  From: Cuca Wood RN (Phone Messages Pool ([id number]_[facility]))   Sent: 12/20/2021 12:26:53 PM CST  Subject: General Message-refills       PCP:  ARIADNA o/c      Time of Call:  12pm       Person Calling:  pt  Phone number:  [phone]    Returned call at: 12:15pm     Note:   VM received from pt, requesting refills of Citalopram and Lisinopril.   TC with pt, confirmed pharmacy, refills sent per protocol.    Lisinopril 10mg 1 tab QD #90  Citalopram 20mg 1 tab QD #90    Last office visit and reason:  11/30/21 ELLIOT ROSENTHAL

## 2022-02-16 NOTE — NURSING NOTE
Received a fax from [facility] for patient's Citalopram.  This was last filled 11/2017 for 1 year to [facility].    Called to patient at 1120 and detailed message was left on identified voicemail asking her to please call back and comfirm that she is changing pharmacies.  Direct number given.  Will await confirmation before filled.Patient did call back and leave message stating that she would like rx sent to [facility] going forward.  Will have [facility] sign and rx will be faxed.

## 2022-02-16 NOTE — PROGRESS NOTES
Patient:   TERRY FORRESTER            MRN: [id number]            FIN: [id number]               Age:   62 years     Sex:  Female     :  1955   Associated Diagnoses:   None   Author:   Cuca Patel      Visit Information      Date of Service: 2017 12:53 pm  Performing Location: [facility]  Encounter#: [id number]      Chief Complaint   2017 1:00 PM CST   Patient is here for annual exam.  Would like flu shot today.  Refill Celexa.      Well Adult History     Patient is here today for routine health maintenance visit.  Needs a refill on her Celexa.  Is doing well on that medication.  Would accept a flu vaccine today.  Pap smear is current.  Mammogram is not current and she will schedule.  History of osteopenia in .  Will recheck DEXA scan.  Colonoscopy is current.  Lipid panel profile and glucose was elevated in .  Is not fasting.  She will return when fasting for screening.  Overall, she is doing well.  White health history form is completed and reviewed with patient today.  She exercises by doing aerobics and weight lifting.  Does not smoke or use drugs.  Does not drink alcohol.  Diet is well balance.  She is retired.  Has gained some weight with jail as she was a .  No other issues.         Review of Systems   Constitutional:  Negative.    Eye:  Negative.    Ear/Nose/Mouth/Throat:  Negative.    Respiratory:  Negative.    Cardiovascular:  Negative.    Breast:  Negative.    Gastrointestinal:  Negative.    Genitourinary:  Negative.    Gynecologic:  Negative.    Hematology/Lymphatics:  Negative.    Endocrine:  Negative.    Immunologic:  Negative.    Musculoskeletal:  Negative.    Integumentary:  Negative.    Neurologic:  Negative.    Psychiatric:  Negative, PHQ-9 and CAGE reviewed and discussed with patient..    All other systems reviewed and negative      Health Status   Allergies:    Allergic Reactions (Selected)  No Known Medication Allergies    Medications:  (Selected)   Prescriptions  Prescribed  citalopram 20 mg oral tablet: 1 tab(s) ( 20 mg ), PO, Daily, # 90 tab(s), 3 Refill(s), Type: Maintenance, Pharmacy: [facility], Appointment scheduled, 1 tab(s) po daily  triamcinolone 0.1% topical cream: 1 kurtis, TOP, BID, # 30 g, 2 Refill(s), Type: Maintenance, Pharmacy: [facility], 1 kurtis top bid  Documented Medications  Documented  Aleve 220 mg oral tablet: 1 tab(s) ( 220 mg ), po, daily, 0 Refill(s), Type: Maintenance  Calcium 600+D: 1 tab(s), po, daily, 0 Refill(s), Type: Maintenance  MiraLax oral powder for reconstitution: ( 17 gm ), po, daily, 0 Refill(s), Type: Maintenance  loratadine: po, prn, 0 Refill(s), Type: Maintenance   Problem list:    All Problems (Selected)  Diverticulosis / SNOMED CT 1468673475 / Confirmed  Ex-Smoker / ICD-9-CM V15.82 / Confirmed  Menopause / ICD-9-.2 / Confirmed  Third degree hemorrhoids / SNOMED CT 428042860 / Confirmed      Histories   Past Medical History:    Active  Diverticulosis (8746606435): Onset on 2011 at 55 years.  Menopause (627.2): Onset in  at 51 years.  Ex-Smoker (V15.82)  Resolved  *Hospitalized@[facility] - Rectal prolapse: Onset on 2016 at 61 years.  Resolved on 2016 at 61 years.  LYME DISEASE (088.81): Onset in the month of 2009 at 54 years  Resolved.  AA (Alcohol Abuse) (305.00):  Resolved.  Glaucoma (96498259):  Resolved.  Pregnancy (090219142):  Resolved on 1982 at 27 years.  Pregnancy (136179202):  Resolved on 3/1/1984 at 28 years.  Pregnancy (571633350):  Resolved on 10/1/1987 at 32 years.  Pregnancy (540030787):  Resolved on 1990 at 34 years.   Family History:    Breast cancer  Cousin (M)  Mother: onset at 75 .  Hypercholesterolemia  Mother  Depression  Mother  Crohn disease  Brother  Prostate cancer.  Father ()     Procedure history:    Mesh rectopexy (185084398) on 2016 at 61 Years.  Comments:  2016 8:17  AM - Brinda Rhodes  Rigid proctoscopy.  THD - Transanal hemorroidal dearterialization on 2016 at 60 Years.  Cardiac computed tomography for calcium scoring (7051895049) on 2015 at 60 Years.  Comments:  10/23/2015 11:46 AM - Kanikajanneth  Ashtyn  Total Agatston calcium score is 5  Colonoscopy (414324556) on 2011 at 55 Years.  Comments:  2011 11:05 AM - Abdifatah MONAHAN, Derek  Normal; repeat 2021  Sigmoidoscopy (67494870) on 2005 at 50 Years.  Laser surgery (17977433) in the month of 2000 at 44 Years.  Comments:  2010 9:18 AM - Renetta Jensen  on R eye for Glaucoma  Therapeutic  (65088647) in the month of 1994 at 38 Years.   section (55172426) in  at 27 Years.      Physical Examination   Vital Signs   2017 1:00 PM CST Peripheral Pulse Rate 86 bpm    HR Method Electronic    Systolic Blood Pressure 132 mmHg    Diastolic Blood Pressure 77 mmHg    Mean Arterial Pressure 95 mmHg    BP Site Right arm    BP Method Electronic      Measurements from flowsheet : Measurements   2017 1:00 PM CST Height Measured - Standard 60 in    Weight Measured - Standard 141 lb    BSA 1.64 m2    Body Mass Index 27.53 kg/m2    Ht/Wt Measurement Refused by Patient? No      General:  Alert and oriented, No acute distress.    Eye:  Pupils are equal, round and reactive to light, Normal conjunctiva.    HENT:  Normocephalic, Oral mucosa is moist.    Neck:  Supple, Non-tender, No lymphadenopathy, No thyromegaly.    Respiratory:  Lungs are clear to auscultation, Breath sounds are equal.    Cardiovascular:  Normal rate, Regular rhythm, No murmur.    Breast:  No mass, No tenderness, No discharge.    Gastrointestinal:  Soft, Non-tender, Non-distended, No organomegaly.    Lymphatics:  No lymphadenopathy neck, axilla, groin.    Integumentary:  Warm, Dry, Pink, No rash, Multiple areas of hyperpigmented flat macules on arms, hands, face..    Neurologic:  Alert, Oriented.    Psychiatric:  Cooperative,  Appropriate mood & affect.       Impression and Plan   Patient Instructions:       Counseled: Patient, Regarding medications, Verbalized understanding.      PLAN  1)  Schedule mammogram.  2)  Schedule DEXA.  3)  Vaccine injection given.  4)  Reinforced the importance of her wonderful lifestyle choices of good nutrition and exercise.    5)  Did discuss that she has a bone spur on her foot which was found during the exam. She does not have any neuropathies or other issues.  Not a problem at this time.  Otherwise, follow-up with a podiatrist.    6)  Refilled Celexa.

## 2022-02-16 NOTE — LETTER
(Inserted Image. Unable to display)         December 02, 2020        TERRY FORRESTER   [address], [place] [id number]        Dear TERRY,     Thank you for selecting [facility] for your healthcare needs. Below you will find the results of your recent test(s) done at our clinic.      Please schedule a follow up appointment if you would like to discuss your high cholesterol and your cardiovascular disease risk.      Result Name Current Result Previous Result Reference Range   Sodium Level (mmol/L)  139 11/17/2020  139 12/10/2019 135 - 146   Potassium Level (mmol/L)  5.1 11/17/2020  4.3 12/10/2019 3.5 - 5.3   Chloride Level (mmol/L)  104 11/17/2020  106 12/10/2019 98 - 110   CO2 Level (mmol/L)  28 11/17/2020  26 12/10/2019 20 - 32   Glucose Level (mg/dL)  99 11/17/2020  98 12/10/2019 65 - 99   BUN (mg/dL) ((H)) 26 11/17/2020  25 12/10/2019 7 - 25   Creatinine Level (mg/dL)  0.89 11/17/2020  0.81 12/10/2019 0.50 - 0.99   BUN/Creat Ratio ((H)) 29 11/17/2020  NOT APPLICABLE 12/10/2019 6 - 22   eGFR (mL/min/1.73m2)  67 11/17/2020  76 12/10/2019 > OR = 60 -    eGFR  (mL/min/1.73m2)  78 11/17/2020  88 12/10/2019 > OR = 60 -    Calcium Level (mg/dL)  10.4 11/17/2020  10.0 12/10/2019 8.6 - 10.4   Hgb A1c  5.6 11/17/2020 ((H)) 5.7 12/10/2019  - <5.7   Cholesterol (mg/dL) ((H)) 264 11/17/2020 ((H)) 240 12/10/2019  - <200   Non-HDL Cholesterol ((H)) 195 11/17/2020 ((H)) 181 12/10/2019  - <130   HDL (mg/dL)  69 11/17/2020  59 12/10/2019 > OR = 50 -    Cholesterol/HDL Ratio  3.8 11/17/2020  4.1 12/10/2019  - <5.0   LDL ((H)) 174 11/17/2020 ((H)) 164 12/10/2019    Triglyceride (mg/dL)  92 11/17/2020  71 12/10/2019  - <150     Please contact my practice at [phone] if you have any questions or concerns.     Sincerely,        Jocelyn Lebron MD      What do your labs mean?  Below is a glossary of commonly ordered labs:  LDL   Bad Cholesterol   HDL   Good Cholesterol  AST/ALT   Liver  Function   Cr/Creatinine   Kidney Function  Microalbumin   Kidney Function  BUN   Kidney Function  PSA   Prostate    TSH   Thyroid Hormone  HgbA1c   Diabetes Test   Hgb (Hemoglobin)   Red Blood Cells

## 2022-02-16 NOTE — TELEPHONE ENCOUNTER
---------------------  From: Tory Veras LPN (Phone Messages Pool ([id number]_[facility]))   To: [facility] Message Pool ([id number]_[place] [facility] [place]);     Sent: 10/7/2020 3:03:04 PM CDT  Subject: citalopram     Phone Message    PCP:   ARIADNA      Time of Call:  2:50pm       Person Calling:  pt  Phone number:  [phone] OK to LM     Note:   Pt LM stating she needs a refill of citalopram. Pt says she has not scheduled her physical yet. She would like a 90 day supply sent to [facility] until she can schedule appt.    Last Rx was 1/3/20 citalopram 20mg 1 tab PO daily #30 with 0 refills    Last office visit and reason:  12/19/19 medicare AWV---------------------  From: Maryanne Duncan CMA ([facility] Message Pool ([id number]_[facility]))   To: Jocelyn Lebron MD;     Sent: 10/8/2020 7:03:23 AM CDT  Subject: FW: citalopram     ok for 90?---------------------  From: Jocelyn Lebron MD   To: [facility] Message Pool ([id number]_[place] [facility] [place]);     Sent: 10/8/2020 4:32:50 PM CDT  Subject: RE: citalopram     if she feels her mood is well controlled then ok to refill for 90 day supply if not well controlled then needs at least a telemed.  thankspt contacted at 0822 and advised - agrees to set up annual exam prior to next refill  doing well on med - mainly for hot flashes, but also helps with her mood  RTC placedPatient called and states pharmacy has still not got approval for medication refill. Did look and it looks like it was never filled. Sent in 90 day supply to [facility], Left message notifying patient.

## 2022-02-16 NOTE — TELEPHONE ENCOUNTER
---------------------  From: Elizabeth Delaney CMA (eRx Pool ([id number]_[facility]))   To: [facility] Message Pool (32224_[place] [facility] [place]);     Sent: 10/5/2020 3:01:23 PM CDT  Subject: FW: Medication Management   Due Date/Time: 10/3/2020 12:26:00 PM CDT     Medication Refill needing approval    PCP:   ARIADNA    Medication:   pimecrolimus topical   Last Filled:  5/15/20    Quantity:  30gm  Refills:  0      Date of last office visit and reason:   1/8/20 periorbital dermatitis    Note:  No note from Derm since last visit    Return to Clinic order placed?  12/2020          ------------------------------------------  From: [facility] [id number] [facility] [employer]  To: Jocelyn Lebron MD  Sent: October 2, 2020 12:26:46 PM CDT  Subject: Medication Management  Due: October 2, 2020 11:09:35 PM CDT     ** On Hold Pending Signature **     Dispensed Drug: pimecrolimus topical (pimecrolimus 1% topical cream), APPLY TO AFFECTED AREA TWICE A DAY  Quantity: 30 gm  Days Supply: 30  Refills: 0  Substitutions Allowed  Notes from Pharmacy:  ---------------------------------------------------------------  From: Maryanne Duncan CMA ([facility] Message Pool ([id number]_[facility]))   To: Jocelyn Lebron MD;     Sent: 10/6/2020 8:42:08 AM CDT  Subject: FW: Medication Management   Due Date/Time: 10/3/2020 12:26:00 PM CDT---------------------  From: Jocelyn Lebron MD   To: [facility] [id number] [facility] [employer]    Sent: 10/7/2020 12:38:08 PM CDT  Subject: FW: Medication Management     ** Submitted: **  Complete:pimecrolimus topical (pimecrolimus 1% topical cream)   Signed by Jocelyn Lebron MD  10/7/2020 5:38:00 PM [facility] [[email or web address]]    ** Approved with modifications: **  pimecrolimus topical (PIMECROLIMUS 1% CREAM)  APPLY TO AFFECTED AREA TWICE A DAY  Qty:  30 gm        Days Supply:  30        Refills:  0          Substitutions Allowed     Route To Pharmacy - [facility] [id number] [facility] [employer]

## 2022-02-16 NOTE — NURSING NOTE
Comprehensive Intake Entered On:  1/8/2020 12:56 PM CST    Performed On:  1/8/2020 12:52 PM CST by Maryanne Lima CMA               Summary   Chief Complaint :   c/o rash on chin and around nose present for 2 months.    Menstrual Status :   Postmenopausal   Weight Measured :   138.8 lb(Converted to: 138 lb 13 oz, 62.96 kg)    Systolic Blood Pressure :   102 mmHg   Diastolic Blood Pressure :   60 mmHg   Mean Arterial Pressure :   74 mmHg   Peripheral Pulse Rate :   81 bpm   BP Site :   Right arm   BP Method :   Manual   HR Method :   Electronic   Temperature Tympanic :   97.0 DegF(Converted to: 36.1 DegC)  (LOW)    Oxygen Saturation :   99 %   Maryanne Lima CMA - 1/8/2020 12:52 PM CST   Health Status   Allergies Verified? :   Yes   Medication History Verified? :   Yes   Pre-Visit Planning Status :   Completed   Tobacco Use? :   Never smoker   Maryanne Lima CMA - 1/8/2020 12:52 PM CST   Consents   Consent for Immunization Exchange :   Consent Granted   Consent for Immunizations to Providers :   Consent Granted   Maryanne Lima CMA - 1/8/2020 12:52 PM CST   Meds / Allergies   (As Of: 1/8/2020 12:56:24 PM CST)   Allergies (Active)   Pneumovax 23  Estimated Onset Date:   Unspecified ; Reactions:   Localized swelling ; Created By:   Meghna Chin PA-C; Reaction Status:   Active ; Category:   Drug ; Substance:   Pneumovax 23 ; Type:   Side Effect ; Updated By:   Meghna Chin PA-C; Reviewed Date:   12/21/2019 9:22 PM CST        Medication List   (As Of: 1/8/2020 12:56:24 PM CST)   Prescription/Discharge Order    citalopram 20 mg oral tablet  :   citalopram 20 mg oral tablet ; Status:   Prescribed ; Ordered As Mnemonic:   citalopram 20 mg oral tablet ; Simple Display Line:   See Instructions, TAKE 1 TABLET BY MOUTH EVERY DAY, 30 tab(s) ; Ordering Provider:   Jocelyn Lebron MD; Catalog Code:   citalopram ; Order Dt/Tm:   1/3/2020 8:25:19 PM CST          citalopram  :   citalopram ; Status:   Prescribed ; Ordered  As Mnemonic:   citalopram 20 mg oral tablet ; Simple Display Line:   20 mg, 1 tab(s), PO, Daily, 90 tab(s), 2 Refill(s) ; Ordering Provider:   Jocelyn Lebron MD; Catalog Code:   citalopram ; Order Dt/Tm:   12/19/2019 9:24:08 AM CST          lisinopril  :   lisinopril ; Status:   Prescribed ; Ordered As Mnemonic:   lisinopril 10 mg oral tablet ; Simple Display Line:   See Instructions, TAKE 1 TABLET BY MOUTH EVERY DAY, 90 EA, 3 Refill(s) ; Ordering Provider:   Jocelyn Lebron MD; Catalog Code:   lisinopril ; Order Dt/Tm:   1/3/2020 8:25:59 PM CST          triamcinolone topical  :   triamcinolone topical ; Status:   Prescribed ; Ordered As Mnemonic:   triamcinolone 0.1% topical cream ; Simple Display Line:   1 kurtis, TOP, BID, 30 g, 2 Refill(s) ; Ordering Provider:   Jocelyn Lebron MD; Catalog Code:   triamcinolone topical ; Order Dt/Tm:   11/16/2018 10:04:16 AM CST            Home Meds    calcium-vitamin D  :   calcium-vitamin D ; Status:   Documented ; Ordered As Mnemonic:   Calcium 600+D ; Simple Display Line:   1 tab(s), po, daily ; Catalog Code:   calcium-vitamin D ; Order Dt/Tm:   4/26/2013 8:58:20 AM CDT          loratadine  :   loratadine ; Status:   Documented ; Ordered As Mnemonic:   loratadine ; Simple Display Line:   po, prn, 0 Refill(s) ; Catalog Code:   loratadine ; Order Dt/Tm:   4/12/2017 11:25:47 AM CDT          naproxen  :   naproxen ; Status:   Documented ; Ordered As Mnemonic:   Aleve 220 mg oral tablet ; Simple Display Line:   220 mg, 1 tab(s), po, daily ; Catalog Code:   naproxen ; Order Dt/Tm:   4/26/2013 8:58:44 AM CDT          polyethylene glycol 3350  :   polyethylene glycol 3350 ; Status:   Documented ; Ordered As Mnemonic:   MiraLax oral powder for reconstitution ; Simple Display Line:   17 gm, po, daily, 0 Refill(s) ; Catalog Code:   polyethylene glycol 3350 ; Order Dt/Tm:   10/4/2016 2:51:12 PM CDT

## 2022-03-02 VITALS
OXYGEN SATURATION: 97 % | WEIGHT: 133 LBS | DIASTOLIC BLOOD PRESSURE: 74 MMHG | SYSTOLIC BLOOD PRESSURE: 118 MMHG | BODY MASS INDEX: 26.19 KG/M2 | HEART RATE: 82 BPM

## 2022-03-02 NOTE — PROGRESS NOTES
Chief Complaint    Pt here to f/u bone scan regarding her hips.  History of Present Illness      osteopenia unsure if she is taking enough Vit D      cad found on ct calcium 2015 will start statin and aspirin  Review of Systems      neg except as per HPI  Physical Exam   Vitals & Measurements    HR: 82 (Peripheral)  BP: 118/74  SpO2: 97%     HT: 59.5 in  WT: 133 lb       Exam:      General: alert and oriented ×3 no acute distress.      HEENT: pupils are equal round and reactive to light extraocular motion is intact. Normocephalic and atraumatic.       Hearing is grossly normal and there is no otorrhea.       Chest: has bilateral rise with no increased work of breathing.      Cardiovascular: normal perfusion and brisk capillary refill.      Musculoskeletal: no gross focal abnormalities and normal gait.      Neuro: no gross focal abnormalities and memory seems intact.      Psychiatric: speech is clear and coherent and fluent. Patient dressed appropriately for the weather. Mood is appropriate and affect is full.                    Discussed with patient to return to clinic if symptoms worsen or do not improve  Assessment/Plan       CAD in native artery (I25.10)        start statin and aspirin         Ordered:          atorvastatin, = 1 tab(s) ( 10 mg ), Oral, daily, Instructions: repeat labs before next refill, # 60 tab(s), 0 Refill(s), Type: Maintenance, Pharmacy: [id number], 1 tab(s) Oral daily,Instr:repeat labs before next refill, 59.75, in, 11/30/21 9:21:00 CST, Hei..., (Ordered)          70113 office o/p est mod 30-39 min (Charge), Quantity: 1, CAD in native artery  Osteopenia                Osteopenia (M85.80)        will check a vit D level, cont wt bearing exercises, calcium         Ordered:          49018 office o/p est mod 30-39 min (Charge), Quantity: 1, CAD in native artery  Osteopenia           Patient Information     Name:CAROLINA FORRESTERAGUS FLORENTINO      Address:      [address],  [place] [id number]     Sex:Female     YOB: 1955     [phone]     Emergency Contact:LASHAE FORRESTER     MRN:[id number]     FIN:[id number]     Location:[facility]     Date of Service:02/10/2022      Primary Care Physician:       Jocelyn Lebron MD, [phone]      Attending Physician:       Jocelyn Lebron MD, [phone]  Problem List/Past Medical History    Ongoing     CAD in native artery     Diverticulosis     Ex-smoker     Hot flashes     Hyperlipidemia     Menopause     Osteopenia     Third degree hemorrhoids    Historical     *Hospitalized@[facility] - Rectal prolapse     AA (alcohol abuse)     Glaucoma     Lyme disease     Pregnancy     Pregnancy     Pregnancy     Pregnancy  Procedure/Surgical History     Mesh rectopexy (2016)      Comments: Rigid proctoscopy..     THD - Transanal hemorroidal dearterialization (2016)     Cardiac computed tomography for calcium scoring (2015)      Comments: Total Agatston calcium score is 5.     Colonoscopy (2011)      Comments: Normal; repeat 2021.     Sigmoidoscopy (2005)     Laser surgery (2000)      Comments: on R eye for Glaucoma.     Therapeutic  (1994)      section ()  Medications    Aleve 220 mg oral tablet, 220 mg= 1 tab(s), Oral, daily    atorvastatin 10 mg oral tablet, 10 mg= 1 tab(s), Oral, daily    Calcium 600+D, 1 tab(s), Oral, daily    citalopram 20 mg oral tablet, 20 mg= 1 tab(s), Oral, daily, 3 refills    lisinopril 10 mg oral tablet, See Instructions, 3 refills    loratadine, Oral, prn    MiraLax oral powder for reconstitution, 17 gm, Oral, daily    pimecrolimus 1% topical cream, See Instructions    triamcinolone 0.1% topical cream, 1 kurtis, Topical, bid, 2 refills  Allergies    Pneumovax 23 (Localized swelling)  Social History    Smoking Status     Former smoker     Alcohol - Past      Past     Electronic Cigarette/Vaping      Electronic Cigarette Use: Never.      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 Highest education level: University degree(s).     Exercise - Occasional exercise      Exercise frequency: 3-4 times/week. Exercise type: Walking.     Home/Environment      Marital status: . Spouse/Partner name: Mino. 3 children. Living situation: Home/Independent. Injuries/Abuse/Neglect in household: No. Feels unsafe at home: No. Family/Friends available for support: Yes. Risks in environment: Stairs, owns secured gun.     Nutrition/Health      Type of diet: Regular. Wants to lose weight: Yes. Sleeping concerns: Yes. Feels highly stressed: No.     Sexual      Identifies as female     Substance Abuse      Past, Cocaine, Marijuana, 1-2 times per year     Tobacco - Past      Former smoker, quit more than 30 days ago, Cigarettes, Previous treatment: Nicotine replacement. Household tobacco concerns: No.  Family History    Alcoholism: Father.    Allergic Rhinitis: Mother.    Allergic rhinitis: Sister.    Alzheimer's disease: Mother.    Arthritis: Mother.    Breast cancer: Mother (Dx at 75) and Cousin (M).    Crohn disease: Brother.    Depression: Mother.    Diabetes mellitus: Sister.    Hypercholesterolemia: Mother.    Melanoma: Mother.    Prostate cancer.: Father.    Daughter: History is negative    Son: History is negative    Son: History is negative  Lab Results       Lab Results (Last 4 results within 90 days)        Sodium Level: 139 mmol/L [135 mmol/L - 146 mmol/L] (11/30/21 10:16:00)       Potassium Level: 4.6 mmol/L [3.5 mmol/L - 5.3 mmol/L] (11/30/21 10:16:00)       Chloride Level: 105 mmol/L [98 mmol/L - 110 mmol/L] (11/30/21 10:16:00)       CO2 Level: 27 mmol/L [20 mmol/L - 32 mmol/L] (11/30/21 10:16:00)       Glucose Level: 96 mg/dL [65 mg/dL - 99 mg/dL] (11/30/21 10:16:00)       BUN: 25 mg/dL [7 mg/dL - 25 mg/dL] (11/30/21 10:16:00)       Creatinine Level: 0.77 mg/dL [0.5 mg/dL - 0.99 mg/dL] (11/30/21 10:16:00)        BUN/Creat Ratio: NOT APPLICABLE [6  - 22] (11/30/21 10:16:00)       eGFR: 79 mL/min/1.73m2 (11/30/21 10:16:00)       eGFR : 92 mL/min/1.73m2 (11/30/21 10:16:00)       Calcium Level: 10.1 mg/dL [8.6 mg/dL - 10.4 mg/dL] (11/30/21 10:16:00)       Bilirubin Total: 0.4 mg/dL [0.2 mg/dL - 1.2 mg/dL] (11/30/21 10:16:00)       Alkaline Phosphatase: 71 unit/L [37 unit/L - 153 unit/L] (11/30/21 10:16:00)       AST/SGOT: 21 unit/L [10 unit/L - 35 unit/L] (11/30/21 10:16:00)       ALT/SGPT: 27 unit/L [6 unit/L - 29 unit/L] (11/30/21 10:16:00)       Protein Total: 7.1 gm/dL [6.1 gm/dL - 8.1 gm/dL] (11/30/21 10:16:00)       Albumin Level: 4.6 gm/dL [3.6 gm/dL - 5.1 gm/dL] (11/30/21 10:16:00)       Globulin: 2.5 [1.9  - 3.7] (11/30/21 10:16:00)       A/G Ratio: 1.8 [1  - 2.5] (11/30/21 10:16:00)       Hgb A1c: 5.8 High (11/30/21 10:16:00)       Cholesterol: 251 mg/dL High (11/30/21 10:16:00)       Non-HDL Cholesterol: 178 High (11/30/21 10:16:00)       HDL: 73 mg/dL (11/30/21 10:16:00)       Cholesterol/HDL Ratio: 3.4 (11/30/21 10:16:00)       LDL: 162 High (11/30/21 10:16:00)       Triglyceride: 66 mg/dL (11/30/21 10:16:00)  Immunizations       Scheduled Immunizations       Dose Date(s)       influenza virus vaccine, inactivated       11/11/2013, 11/23/2021       SARS-CoV-2 (COVID-19) Moderna-1273       02/18/2021, 03/18/2021, 11/23/2021       zoster vaccine, inactivated       10/22/2020, 12/22/2020       Other Immunizations               influenza       11/16/2018       influenza virus vaccine, inactivated       12/01/2010, 08/31/2015, 10/04/2016, 11/14/2017, 11/16/2018, 12/19/2019, 10/22/2020       hepatitis A adult vaccine       11/17/2020       pneumococcal (PPSV23)       12/19/2019       Td       04/01/1981, 03/10/1993, 04/04/2003       tetanus/diphth/pertuss (Tdap) adult/adol       08/18/2014       influenza, H1N1, inactivated       12/15/2009       pneumococcal (PCV13)       11/16/2018

## 2022-03-02 NOTE — NURSING NOTE
Comprehensive Intake Entered On:  2/10/2022 9:58 AM CST    Performed On:  2/10/2022 9:51 AM CST by Batool العراقي               Summary   Chief Complaint :   Pt here to f/u bone scan regarding her hips.    Advance Directive :   No   Menstrual Status :   Postmenopausal   Weight Measured :   133 lb(Converted to: 133 lb 0 oz, 60.328 kg)    Height/Length Estimated :   59.5 in(Converted to: 4 ft 11 in, 151.13 cm)    Systolic Blood Pressure :   118 mmHg   Diastolic Blood Pressure :   74 mmHg   Mean Arterial Pressure :   89 mmHg   Peripheral Pulse Rate :   82 bpm   BP Site :   Right arm   BP Method :   Manual   Oxygen Saturation :   97 %   Batool العراقي - 2/10/2022 9:51 AM CST   Health Status   Allergies Verified? :   Yes   Medication History Verified? :   Yes   Batool العراقي - 2/10/2022 9:51 AM CST   Consents   Consent for Immunization Exchange :   Consent Granted   Consent for Immunizations to Providers :   Consent Granted   Batool العراقي - 2/10/2022 9:51 AM CST   Meds / Allergies   (As Of: 2/10/2022 9:58:37 AM CST)   Allergies (Active)   Pneumovax 23  Estimated Onset Date:   Unspecified ; Reactions:   Localized swelling ; Created By:   Meghna Chin PA-C; Reaction Status:   Active ; Category:   Drug ; Substance:   Pneumovax 23 ; Type:   Side Effect ; Updated By:   Meghna Chin PA-C; Reviewed Date:   2/10/2022 9:52 AM CST        Medication List   (As Of: 2/10/2022 9:58:37 AM CST)   Prescription/Discharge Order    citalopram  :   citalopram ; Status:   Prescribed ; Ordered As Mnemonic:   citalopram 20 mg oral tablet ; Simple Display Line:   20 mg, 1 tab(s), Oral, daily, 90 tab(s), 3 Refill(s) ; Ordering Provider:   Jocelyn Lebron MD; Catalog Code:   citalopram ; Order Dt/Tm:   12/20/2021 12:21:51 PM CST          lisinopril  :   lisinopril ; Status:   Prescribed ; Ordered As Mnemonic:   lisinopril 10 mg oral tablet ; Simple Display Line:   See Instructions, TAKE 1 TABLET BY MOUTH EVERY DAY, 90  tab(s), 3 Refill(s) ; Ordering Provider:   Jocelyn Lebron MD; Catalog Code:   lisinopril ; Order Dt/Tm:   12/20/2021 12:23:26 PM CST          pimecrolimus topical  :   pimecrolimus topical ; Status:   Prescribed ; Ordered As Mnemonic:   pimecrolimus 1% topical cream ; Simple Display Line:   See Instructions, APPLY TO AFFECTED AREA TWICE A DAY, 30 gm, 0 Refill(s) ; Ordering Provider:   Jocelyn Lebron MD; Catalog Code:   pimecrolimus topical ; Order Dt/Tm:   10/7/2020 12:38:03 PM CDT          triamcinolone topical  :   triamcinolone topical ; Status:   Prescribed ; Ordered As Mnemonic:   triamcinolone 0.1% topical cream ; Simple Display Line:   1 kurtis, TOP, BID, 30 g, 2 Refill(s) ; Ordering Provider:   Jocelyn Lebron MD; Catalog Code:   triamcinolone topical ; Order Dt/Tm:   11/30/2021 9:51:03 AM CST            Home Meds    calcium-vitamin D  :   calcium-vitamin D ; Status:   Documented ; Ordered As Mnemonic:   Calcium 600+D ; Simple Display Line:   1 tab(s), po, daily ; Catalog Code:   calcium-vitamin D ; Order Dt/Tm:   4/26/2013 8:58:20 AM CDT          loratadine  :   loratadine ; Status:   Documented ; Ordered As Mnemonic:   loratadine ; Simple Display Line:   po, prn, 0 Refill(s) ; Catalog Code:   loratadine ; Order Dt/Tm:   4/12/2017 11:25:47 AM CDT          naproxen  :   naproxen ; Status:   Documented ; Ordered As Mnemonic:   Aleve 220 mg oral tablet ; Simple Display Line:   220 mg, 1 tab(s), po, daily ; Catalog Code:   naproxen ; Order Dt/Tm:   4/26/2013 8:58:44 AM CDT          polyethylene glycol 3350  :   polyethylene glycol 3350 ; Status:   Documented ; Ordered As Mnemonic:   MiraLax oral powder for reconstitution ; Simple Display Line:   17 gm, po, daily, 0 Refill(s) ; Catalog Code:   polyethylene glycol 3350 ; Order Dt/Tm:   10/4/2016 2:51:12 PM CDT

## 2022-03-02 NOTE — LETTER
(Inserted Image. Unable to display)       January 07, 2022        TERRY FORRESTER   [address] [place] [address]        Dear TERRY,      Thank you for selecting [facility] for your healthcare needs.      Your bone density study shows your spine has gotten better but your hips have gotten worse.  Please schedule an appointment for us to discuss theses results and discuss treatment options.           Please contact my practice at [phone] if you have any questions or concerns.     Sincerely,        Jocelyn Lebron MD

## 2022-04-10 DIAGNOSIS — I25.10 ATHEROSCLEROTIC HEART DISEASE OF NATIVE CORONARY ARTERY WITHOUT ANGINA PECTORIS: ICD-10-CM

## 2022-04-14 NOTE — TELEPHONE ENCOUNTER
TC: please contact patient to set up appointment for lab recheck for refill request.    Will route to provider to advise.    Last visit: 2/10/22 (note to recheck labs prior to next refill).  LDL: 11/30/21

## 2022-04-16 ENCOUNTER — NURSE TRIAGE (OUTPATIENT)
Dept: NURSING | Facility: CLINIC | Age: 67
End: 2022-04-16
Payer: COMMERCIAL

## 2022-04-16 NOTE — TELEPHONE ENCOUNTER
Nurse Triage SBAR    Is this a 2nd Level Triage? NO    Situation/Background: Patient is calling with concern of needing to schedule a lab test prior to refill of simvasatitin.     Assessment: Unable to see if there is an order in system. Patient was transferred to FNA triage from scheduling.    Recommendation: Per Protocol, information only. Read her the note from 4/10/22 that contact attempt to schedule was made. Advised that the patient call Monday morning when the clinic is open. Patient agreed with the plan.      Reina Ramos RN on 4/16/2022 at 8:51 AM    Reason for Disposition    [1] Caller requesting NON-URGENT health information AND [2] PCP's office is the best resource    Protocols used: INFORMATION ONLY CALL - NO TRIAGE-A-

## 2022-04-19 ENCOUNTER — TELEPHONE (OUTPATIENT)
Dept: FAMILY MEDICINE | Facility: CLINIC | Age: 67
End: 2022-04-19
Payer: COMMERCIAL

## 2022-04-19 NOTE — TELEPHONE ENCOUNTER
Reason for Call: Request for an order or referral:    Order or referral being requested: lab order    Date needed: within one week/ scheduled for Monday, 4.25.22 at 8:15, but will be out of medication by then. Should I call patient and move the lab appointment sooner?      Has the patient been seen by the PCP for this problem? YES    Additional comments: Cholesterol check due to medication for high cholesterol, Rx is for Atorvastatin    Phone number Patient can be reached at:  Cell number on file:    Telephone Information:   Mobile [phone]       Best Time:  any    Can we leave a detailed message on this number?  YES       Call taken on 4/19/2022 at 9:52 AM by NANCY SHERMAN

## 2022-04-20 ENCOUNTER — DOCUMENTATION ONLY (OUTPATIENT)
Dept: LAB | Facility: CLINIC | Age: 67
End: 2022-04-20
Payer: COMMERCIAL

## 2022-04-20 DIAGNOSIS — I25.10 CORONARY ARTERY DISEASE INVOLVING NATIVE CORONARY ARTERY OF NATIVE HEART WITHOUT ANGINA PECTORIS: Primary | ICD-10-CM

## 2022-04-21 RX ORDER — ATORVASTATIN CALCIUM 10 MG/1
10 TABLET, FILM COATED ORAL DAILY
Qty: 30 TABLET | Refills: 0 | Status: SHIPPED | OUTPATIENT
Start: 2022-04-21 | End: 2022-04-27

## 2022-04-21 RX ORDER — ATORVASTATIN CALCIUM 10 MG/1
TABLET, FILM COATED ORAL
Qty: 60 TABLET | OUTPATIENT
Start: 2022-04-21

## 2022-04-25 ENCOUNTER — LAB (OUTPATIENT)
Dept: LAB | Facility: CLINIC | Age: 67
End: 2022-04-25
Payer: COMMERCIAL

## 2022-04-25 DIAGNOSIS — I25.10 CORONARY ARTERY DISEASE INVOLVING NATIVE CORONARY ARTERY OF NATIVE HEART WITHOUT ANGINA PECTORIS: ICD-10-CM

## 2022-04-25 LAB
CHOLEST SERPL-MCNC: 184 MG/DL
FASTING STATUS PATIENT QL REPORTED: YES
HDLC SERPL-MCNC: 69 MG/DL
LDLC SERPL CALC-MCNC: 104 MG/DL
NONHDLC SERPL-MCNC: 115 MG/DL
TRIGL SERPL-MCNC: 55 MG/DL

## 2022-04-25 PROCEDURE — 36415 COLL VENOUS BLD VENIPUNCTURE: CPT | Performed by: FAMILY MEDICINE

## 2022-04-25 PROCEDURE — 80061 LIPID PANEL: CPT | Performed by: FAMILY MEDICINE

## 2022-04-27 RX ORDER — ATORVASTATIN CALCIUM 20 MG/1
20 TABLET, FILM COATED ORAL DAILY
Qty: 90 TABLET | Refills: 0 | Status: SHIPPED | OUTPATIENT
Start: 2022-04-27 | End: 2022-07-18

## 2022-05-15 DIAGNOSIS — I25.10 ATHEROSCLEROTIC HEART DISEASE OF NATIVE CORONARY ARTERY WITHOUT ANGINA PECTORIS: ICD-10-CM

## 2022-05-17 RX ORDER — ATORVASTATIN CALCIUM 10 MG/1
10 TABLET, FILM COATED ORAL DAILY
Qty: 30 TABLET | Refills: 0 | OUTPATIENT
Start: 2022-05-17

## 2022-05-17 NOTE — TELEPHONE ENCOUNTER
Requested too soon - 4/27/22 90 day supply  Receipt confirmed by pharmacy (4/27/2022  7:09 PM CDT)

## 2022-07-15 DIAGNOSIS — I25.10 CORONARY ARTERY DISEASE INVOLVING NATIVE CORONARY ARTERY OF NATIVE HEART WITHOUT ANGINA PECTORIS: ICD-10-CM

## 2022-07-18 RX ORDER — ATORVASTATIN CALCIUM 20 MG/1
TABLET, FILM COATED ORAL
Qty: 90 TABLET | Refills: 0 | Status: SHIPPED | OUTPATIENT
Start: 2022-07-18 | End: 2022-10-20

## 2022-07-18 NOTE — TELEPHONE ENCOUNTER
"    Last Written Prescription Date:  2/10/22  Last Fill Quantity: 90,  # refills: 1   Last office visit: 2/10/22  Future Office Visit:              Requested Prescriptions   Pending Prescriptions Disp Refills     atorvastatin (LIPITOR) 20 MG tablet [Pharmacy Med Name: ATORVASTATIN 20 MG TABLET] 90 tablet 0     Sig: TAKE 1 TABLET BY MOUTH EVERY DAY       [facility] Protocol Passed - 7/15/2022  2:02 AM        Passed - LDL on file in past 12 months     Recent Labs   Lab Test 04/25/22  0816   *             Passed - No abnormal creatine kinase in past 12 months     No lab results found.             Passed - Recent (12 mo) or future (30 days) visit within the authorizing provider's specialty     Patient has had an office visit with the authorizing provider or a provider within the authorizing providers department within the previous 12 mos or has a future within next 30 days. See \"Patient Info\" tab in inbasket, or \"Choose Columns\" in Meds & Orders section of the refill encounter.              Passed - Medication is active on med list        Passed - Patient is age 18 or older        Passed - No active pregnancy on record        Passed - No positive pregnancy test in past 12 months             JETHRO HARTMAN RN 07/18/22 12:58 PM    "

## 2022-10-15 DIAGNOSIS — I25.10 CORONARY ARTERY DISEASE INVOLVING NATIVE CORONARY ARTERY OF NATIVE HEART WITHOUT ANGINA PECTORIS: ICD-10-CM

## 2022-10-17 NOTE — TELEPHONE ENCOUNTER
"Routing refill request to provider for review/approval because:  Per lab note dated 4/25/2022 patient to repeat LDL in 1-2 months, lab is from 4/25/22.    Last Written Prescription Date:  7/18/2022  Last Fill Quantity: 90,  # refills: 0   Last office visit provider:  2/10/2022     Requested Prescriptions   Pending Prescriptions Disp Refills     atorvastatin (LIPITOR) 20 MG tablet [Pharmacy Med Name: ATORVASTATIN 20 MG TABLET] 90 tablet 0     Sig: TAKE 1 TABLET BY MOUTH EVERY DAY       Statins Protocol Passed - 10/15/2022  8:49 AM        Passed - LDL on file in past 12 months     Recent Labs   Lab Test 04/25/22  0816   *             Passed - No abnormal creatine kinase in past 12 months     No lab results found.             Passed - Recent (12 mo) or future (30 days) visit within the authorizing provider's specialty     Patient has had an office visit with the authorizing provider or a provider within the authorizing providers department within the previous 12 mos or has a future within next 30 days. See \"Patient Info\" tab in inbasket, or \"Choose Columns\" in Meds & Orders section of the refill encounter.              Passed - Medication is active on med list        Passed - Patient is age 18 or older        Passed - No active pregnancy on record        Passed - No positive pregnancy test in past 12 months             Caridad Garcia RN 10/17/22 11:57 AM  "

## 2022-10-20 RX ORDER — ATORVASTATIN CALCIUM 20 MG/1
TABLET, FILM COATED ORAL
Qty: 90 TABLET | Refills: 0 | Status: SHIPPED | OUTPATIENT
Start: 2022-10-20 | End: 2023-01-06

## 2023-01-06 DIAGNOSIS — I25.10 CORONARY ARTERY DISEASE INVOLVING NATIVE CORONARY ARTERY OF NATIVE HEART WITHOUT ANGINA PECTORIS: ICD-10-CM

## 2023-01-06 RX ORDER — ATORVASTATIN CALCIUM 20 MG/1
TABLET, FILM COATED ORAL
Qty: 90 TABLET | Refills: 0 | Status: SHIPPED | OUTPATIENT
Start: 2023-01-06 | End: 2023-03-30

## 2023-01-06 NOTE — TELEPHONE ENCOUNTER
Prescription approved per [facility] Refill Protocol.    Last Written Prescription Date: 10/20/22  Last Fill Quantity: 90,  # refills: 0   Last office visit: 2/10/22

## 2023-01-31 ENCOUNTER — OFFICE VISIT (OUTPATIENT)
Dept: FAMILY MEDICINE | Facility: CLINIC | Age: 68
End: 2023-01-31
Payer: COMMERCIAL

## 2023-01-31 VITALS
BODY MASS INDEX: 27.42 KG/M2 | DIASTOLIC BLOOD PRESSURE: 64 MMHG | SYSTOLIC BLOOD PRESSURE: 94 MMHG | RESPIRATION RATE: 16 BRPM | HEART RATE: 87 BPM | WEIGHT: 136 LBS | HEIGHT: 59 IN | OXYGEN SATURATION: 96 %

## 2023-01-31 DIAGNOSIS — I10 BENIGN ESSENTIAL HYPERTENSION: ICD-10-CM

## 2023-01-31 DIAGNOSIS — Z12.11 SPECIAL SCREENING FOR MALIGNANT NEOPLASMS, COLON: ICD-10-CM

## 2023-01-31 DIAGNOSIS — Z71.89 ACP (ADVANCE CARE PLANNING): ICD-10-CM

## 2023-01-31 DIAGNOSIS — I25.10 CORONARY ARTERY DISEASE INVOLVING NATIVE CORONARY ARTERY OF NATIVE HEART WITHOUT ANGINA PECTORIS: ICD-10-CM

## 2023-01-31 DIAGNOSIS — M85.852 OSTEOPENIA OF BOTH HIPS: ICD-10-CM

## 2023-01-31 DIAGNOSIS — R79.89 ELEVATED SERUM CREATININE: ICD-10-CM

## 2023-01-31 DIAGNOSIS — Z51.81 ENCOUNTER FOR THERAPEUTIC DRUG MONITORING: ICD-10-CM

## 2023-01-31 DIAGNOSIS — Z00.00 ENCOUNTER FOR MEDICARE ANNUAL WELLNESS EXAM: Primary | ICD-10-CM

## 2023-01-31 DIAGNOSIS — Z23 NEED FOR VACCINATION: ICD-10-CM

## 2023-01-31 DIAGNOSIS — Z13.220 LIPID SCREENING: ICD-10-CM

## 2023-01-31 DIAGNOSIS — Z12.11 SCREEN FOR COLON CANCER: ICD-10-CM

## 2023-01-31 DIAGNOSIS — M85.851 OSTEOPENIA OF BOTH HIPS: ICD-10-CM

## 2023-01-31 PROBLEM — M85.80 OSTEOPENIA: Status: ACTIVE | Noted: 2023-01-31

## 2023-01-31 LAB
ANION GAP SERPL CALCULATED.3IONS-SCNC: 13 MMOL/L (ref 7–15)
BUN SERPL-MCNC: 24 MG/DL (ref 8–23)
CALCIUM SERPL-MCNC: 10 MG/DL (ref 8.8–10.2)
CHLORIDE SERPL-SCNC: 103 MMOL/L (ref 98–107)
CHOLEST SERPL-MCNC: 176 MG/DL
CREAT SERPL-MCNC: 1.09 MG/DL (ref 0.51–0.95)
DEPRECATED HCO3 PLAS-SCNC: 23 MMOL/L (ref 22–29)
GFR SERPL CREATININE-BSD FRML MDRD: 55 ML/MIN/1.73M2
GLUCOSE SERPL-MCNC: 105 MG/DL (ref 70–99)
HDLC SERPL-MCNC: 66 MG/DL
LDLC SERPL CALC-MCNC: 87 MG/DL
NONHDLC SERPL-MCNC: 110 MG/DL
POTASSIUM SERPL-SCNC: 4.3 MMOL/L (ref 3.4–5.3)
SODIUM SERPL-SCNC: 139 MMOL/L (ref 136–145)
TRIGL SERPL-MCNC: 113 MG/DL

## 2023-01-31 PROCEDURE — G0439 PPPS, SUBSEQ VISIT: HCPCS | Performed by: FAMILY MEDICINE

## 2023-01-31 PROCEDURE — 80061 LIPID PANEL: CPT | Performed by: FAMILY MEDICINE

## 2023-01-31 PROCEDURE — 36415 COLL VENOUS BLD VENIPUNCTURE: CPT | Performed by: FAMILY MEDICINE

## 2023-01-31 PROCEDURE — 99214 OFFICE O/P EST MOD 30 MIN: CPT | Mod: 25 | Performed by: FAMILY MEDICINE

## 2023-01-31 PROCEDURE — 80048 BASIC METABOLIC PNL TOTAL CA: CPT | Performed by: FAMILY MEDICINE

## 2023-01-31 RX ORDER — LISINOPRIL 10 MG/1
TABLET ORAL
COMMUNITY
Start: 2021-12-20 | End: 2023-03-10

## 2023-01-31 RX ORDER — CITALOPRAM HYDROBROMIDE 20 MG/1
20 TABLET ORAL
COMMUNITY
Start: 2021-12-20 | End: 2023-03-10

## 2023-01-31 RX ORDER — CALCIUM CARBONATE 500(1250)
1 TABLET ORAL 2 TIMES DAILY
COMMUNITY

## 2023-01-31 RX ORDER — TRIAMCINOLONE ACETONIDE 1 MG/G
CREAM TOPICAL
COMMUNITY
Start: 2021-11-30

## 2023-01-31 RX ORDER — NAPROXEN SODIUM 220 MG
220 TABLET ORAL
COMMUNITY
End: 2024-02-01

## 2023-01-31 ASSESSMENT — ENCOUNTER SYMPTOMS
HEMATOCHEZIA: 0
HEARTBURN: 0
ARTHRALGIAS: 1
NERVOUS/ANXIOUS: 0
FEVER: 0
CHILLS: 0
CONSTIPATION: 0
MYALGIAS: 0
FREQUENCY: 0
COUGH: 0
EYE PAIN: 0
DYSURIA: 0
PALPITATIONS: 0
BREAST MASS: 0
WEAKNESS: 0
NAUSEA: 0
DIARRHEA: 0
PARESTHESIAS: 0
HEADACHES: 0
SORE THROAT: 0
HEMATURIA: 0
SHORTNESS OF BREATH: 0
ABDOMINAL PAIN: 0
DIZZINESS: 0

## 2023-01-31 ASSESSMENT — ACTIVITIES OF DAILY LIVING (ADL): CURRENT_FUNCTION: NO ASSISTANCE NEEDED

## 2023-01-31 NOTE — PATIENT INSTRUCTIONS
The International osteoporosis foundation website is an excellent resource for information to help reduce your risk of developing osteoporosis.  You will be due for your next bone density study in December 2023.  We can plan to order this for you when you come in for your next wellness visit.    You can schedule your mammogram at the local hospital by calling to schedule the appointment.  You do not need a referral from me.      Your blood pressure today was very well controlled.  Please try changing your lisinopril from 10 mg a whole tablet daily to 10 mg a half of a tablet daily.  1 week later check your blood pressure daily for a week and let me know your blood pressure readings.  Remind me to renew your lisinopril.  If your blood pressure is well controlled on the 5 mg daily then lets switch you to 5 mg daily.    You are due for a tetanus vaccine,  from your pharmacy or the public health department.        Patient Education   Personalized Prevention Plan  You are due for the preventive services outlined below.  Your care team is available to assist you in scheduling these services.  If you have already completed any of these items, please share that information with your care team to update in your medical record.  Health Maintenance Due   Topic Date Due    ANNUAL REVIEW OF HM ORDERS  Never done    Discuss Advance Care Planning  Never done    Hepatitis C Screening  Never done    Colorectal Cancer Screening  01/16/2021    COVID-19 Vaccine (4 - Booster for Moderna series) 01/18/2022    Flu Vaccine (1) 09/01/2022       Signs of Hearing Loss      Hearing much better with one ear can be a sign of hearing loss.   Hearing loss is a problem shared by many people. In fact, it is one of the most common health problems, particularly as people age. Most people age 65 and older have some hearing loss. By age 80, almost everyone does. Hearing loss often occurs slowly over the years. So you may not realize your hearing has  gotten worse.  Have your hearing checked  Call your healthcare provider if you:  Have to strain to hear normal conversation  Have to watch other people s faces very carefully to follow what they re saying  Need to ask people to repeat what they ve said  Often misunderstand what people are saying  Turn the volume of the television or radio up so high that others complain  Feel that people are mumbling when they re talking to you  Find that the effort to hear leaves you feeling tired and irritated  Notice, when using the phone, that you hear better with one ear than the other  [employer] last reviewed this educational content on 1/1/2020 2000-2021 [employer]. All rights reserved. This information is not intended as a substitute for professional medical care. Always follow your healthcare professional's instructions.          Preventing Falls at Home  A person can fall for many reasons. Older adults may fall because reaction time slows down as we age. Your muscles and joints may get stiff, weak, or less flexible because of illness, medicines, or a physical condition.   Other health problems that make falls more likely include:   Arthritis  Dizziness or lightheadedness when you stand up (orthostatic hypotension)  History of a stroke  Dizziness  Anemia  Certain medicines taken for mental illness or to control blood pressure.  Problems with balance or gait  Bladder or urinary problems  History of falling  Changes in vision (vision impairment)  Changes in thinking skills and memory (cognitive impairment)  Injuries from a fall can include serious injuries such as broken bones, dislocated joints, internal bleeding and cuts. Injuries like these can limit your independence.   Prevention tips  To help prevent falls and fall-related injuries, follow the tips below.    Floors  To make floors safer:   Put nonskid pads under area rugs.  Remove small rugs.  Replace worn floor coverings.  Tack carpets firmly to each  step on carpeted stairs. Put nonskid strips on the edges of uncarpeted stairs.  Keep floors and stairs free of clutter and cords.  Arrange furniture so there are clear pathways.  Clean up any spills right away.  Bathrooms    To make bathrooms safer:   Install grab bars in the tub or shower.  Apply nonskid strips or put a nonskid rubber mat in the tub or shower.  Sit on a bath chair to bathe.  Use bathmats with nonskid backing.  Lighting  To improve visibility in your home:    Keep a flashlight in each room. Or put a lamp next to the bed within easy reach.  Put nightlights in the bedrooms, hallways, kitchen, and bathrooms.  Make sure all stairways have good lighting.  Take your time when going up and down stairs.  Put handrails on both sides of stairs and in walkways for more support. To prevent injury to your wrist or arm, don t use handrails to pull yourself up.  Install grab bars to pull yourself up.  Move or rearrange items that you use often. This will make them easier to find or reach.  Look at your home to find any safety hazards. Especially look at doorways, walkways, and the driveway. Remove or repair any safety problems that you find.  Other changes to make  Look around to find any safety hazards. Look closely at doorways, walkways, and the driveway. Remove or repair any safety problems that you find.  Wear shoes that fit well.  Take your time when going up and down stairs.  Put handrails on both sides of stairs and in walkways for more support. To prevent injury to your wrist or arm, don t use handrails to pull yourself up.  Install grab bars wherever needed to pull yourself up.  Arrange items that you use often. This will make them easier to find or reach.    [employer] last reviewed this educational content on 3/1/2020    9794-7856 [employer]. All rights reserved. This information is not intended as a substitute for professional medical care. Always follow your healthcare professional's  instructions.

## 2023-01-31 NOTE — PROGRESS NOTES
"SUBJECTIVE:   Catie is a 67 year old who presents for Preventive Visit.    Patient has been advised of split billing requirements and indicates understanding: Yes  Are you in the first 12 months of your Medicare coverage?  No    Healthy Habits:     In general, how would you rate your overall health?  Good    Frequency of exercise:  2-3 days/week    Duration of exercise:  15-30 minutes    Do you usually eat at least 4 servings of fruit and vegetables a day, include whole grains    & fiber and avoid regularly eating high fat or \"junk\" foods?  Yes    Taking medications regularly:  Yes    Medication side effects:  None    Ability to successfully perform activities of daily living:  No assistance needed    Home Safety:  Lack of grab bars in the bathroom    Hearing Impairment:  Difficulty following a conversation in a noisy restaurant or crowded room and need to ask people to speak up or repeat themselves    In the past 6 months, have you been bothered by leaking of urine?  No    In general, how would you rate your overall mental or emotional health?  Good      PHQ-2 Total Score: 0    Additional concerns today:  No        The patient was provided with written information regarding signs of hearing loss.  She is at risk for falling and has been provided with information to reduce the risk of falling at home.        Have you ever done Advance Care Planning? (For example, a Health Directive, POLST, or a discussion with a medical provider or your loved ones about your wishes): No, Honoring Choices given to patient.       Right Ear:      1000 Hz RESPONSE- on Level: 40 db (Conditioning sound)   1000 Hz: RESPONSE- on Level:   20 db    2000 Hz: RESPONSE- on Level:   20 db    4000 Hz: RESPONSE- on Level:   20 db     Left Ear:      4000 Hz: RESPONSE- on Level:   20 db    2000 Hz: RESPONSE- on Level:   20 db    1000 Hz: RESPONSE- on Level:   20 db     500 Hz: RESPONSE- on Level: 25 db    Right Ear:    500 Hz: RESPONSE- on Level: 25 " db    Hearing Acuity: Pass    Hearing Assessment: normal   Fall risk  Fallen 2 or more times in the past year?: Yes  Any fall with injury in the past year?: No  click delete button to remove this line now  Cognitive Screening   1) Repeat 3 items (Leader, Season, Table)    2) Clock draw: NORMAL  3) 3 item recall: Recalls 2 objects   Results: NORMAL clock, 1-2 items recalled: COGNITIVE IMPAIRMENT LESS LIKELY    Mini-CogTM Copyright MERARY Martinez. Licensed by the author for use in [facility]; reprinted with permission ([email or web address]). All rights reserved.          Reviewed and updated as needed this visit by clinical staff   Tobacco   Meds              Reviewed and updated as needed this visit by Provider                 Social History     Tobacco Use     Smoking status: Former     Types: Cigarettes     Quit date: 1989     Years since quittin.7     Smokeless tobacco: Never   Substance Use Topics     Alcohol use: Not on file         Alcohol Use 2023   Prescreen: >3 drinks/day or >7 drinks/week? No         Current providers sharing in care for this patient include:   Patient Care Team:  Jocelyn Lebron MD as PCP - General (Family Medicine)  Jocelyn Lebron MD as Assigned PCP    The following health maintenance items are reviewed in Epic and correct as of today:  Health Maintenance   Topic Date Due     ANNUAL REVIEW OF HM ORDERS  Never done     HEPATITIS C SCREENING  Never done     COLORECTAL CANCER SCREENING  2021     COVID-19 Vaccine (4 - Booster for Moderna series) 2022     INFLUENZA VACCINE (1) 2022     MAMMO SCREENING  2023     MEDICARE ANNUAL WELLNESS VISIT  2024     FALL RISK ASSESSMENT  2024     DTAP/TDAP/TD IMMUNIZATION (5 - Td or Tdap) 2024     LIPID  2028     ADVANCE CARE PLANNING  2028     DEXA  2036     PHQ-2 (once per calendar year)  Completed     Pneumococcal Vaccine: 65+ Years  Completed     ZOSTER IMMUNIZATION   "Completed     IPV IMMUNIZATION  Aged Out     MENINGITIS IMMUNIZATION  Aged Out         FHS-7:   Breast CA Risk Assessment (FHS-7) 1/31/2023   Did any of your first-degree relatives have breast or ovarian cancer? Yes   Did any of your relatives have bilateral breast cancer? No   Did any man in your family have breast cancer? No   Did any woman in your family have breast and ovarian cancer? Yes   Did any woman in your family have breast cancer before age 50 y? No   Do you have 2 or more relatives with breast and/or ovarian cancer? No   Do you have 2 or more relatives with breast and/or bowel cancer? No     No ovarian cancer, mom had breast cancer in her 70's        Pertinent mammograms are reviewed under the imaging tab.    Review of Systems   Constitutional: Negative for chills and fever.   HENT: Negative for congestion, ear pain, hearing loss and sore throat.    Eyes: Negative for pain and visual disturbance.   Respiratory: Negative for cough and shortness of breath.    Cardiovascular: Negative for chest pain, palpitations and peripheral edema.   Gastrointestinal: Negative for abdominal pain, constipation, diarrhea, heartburn, hematochezia and nausea.   Breasts:  Negative for tenderness, breast mass and discharge.   Genitourinary: Negative for dysuria, frequency, genital sores, hematuria, pelvic pain, urgency, vaginal bleeding and vaginal discharge.   Musculoskeletal: Positive for arthralgias. Negative for myalgias.   Skin: Negative for rash.   Neurological: Negative for dizziness, weakness, headaches and paresthesias.   Psychiatric/Behavioral: Negative for mood changes. The patient is not nervous/anxious.        OBJECTIVE:   BP 94/64 (BP Location: Right arm, Patient Position: Sitting)   Pulse 87   Resp 16   Ht 1.499 m (4' 11\")   Wt 61.7 kg (136 lb)   SpO2 96%   BMI 27.47 kg/m   Estimated body mass index is 27.47 kg/m  as calculated from the following:    Height as of this encounter: 1.499 m (4' 11\").    " Weight as of this encounter: 61.7 kg (136 lb).  Physical Exam      General: alert and oriented ×3 no acute distress.    HEENT: pupils are equal round and reactive to light extraocular motion is intact. Normocephalic and atraumatic.     Hearing is grossly normal and there is no otorrhea.     Chest: has bilateral rise with no increased work of breathing.    Cardiovascular: normal perfusion and brisk capillary refill.    Musculoskeletal: no gross focal abnormalities and normal gait.    Neuro: no gross focal abnormalities and memory seems intact.    Psychiatric: speech is clear and coherent and fluent. Patient dressed appropriately for the weather. Mood is appropriate and affect is full.        ASSESSMENT / PLAN:       ICD-10-CM    1. Encounter for Medicare annual wellness exam  Z00.00       2. Need for vaccination  Z23 INFLUENZA VACCINE 65+ (FLUZONE HD)     COVID-19 VACCINE 18+ (MODERNA PRIMARY SERIES)      3. Special screening for malignant neoplasms, colon  Z12.11       4. Screen for colon cancer  Z12.11 Fecal colorectal cancer screen (FIT)      5. Osteopenia of both hips  M85.851     M85.852       6. Encounter for therapeutic drug monitoring  Z51.81       7. Coronary artery disease involving native coronary artery of native heart without angina pectoris  I25.10 Basic metabolic panel     aspirin (ASA) 81 MG EC tablet     Lipid panel reflex to direct LDL Fasting     Basic metabolic panel     Lipid panel reflex to direct LDL Fasting     CANCELED: Lipid panel reflex to direct LDL Fasting      8. Benign essential hypertension  I10 Basic metabolic panel     aspirin (ASA) 81 MG EC tablet     Basic metabolic panel     CANCELED: Lipid panel reflex to direct LDL Fasting      9. ACP (advance care planning)  Z71.89       10. Lipid screening  Z13.220 Lipid panel reflex to direct LDL Fasting     Lipid panel reflex to direct LDL Fasting       blood pressure today was very well controlled.  Please try changing your lisinopril from  10 mg a whole tablet daily to 10 mg a half of a tablet daily.  1 week later check your blood pressure daily for a week and let me know your blood pressure readings.  Remind me to renew your lisinopril.  If your blood pressure is well controlled on the 5 mg daily then lets switch you to 5 mg daily.    Coronary artery disease we will have patient take aspirin 81 mg daily.  Rechecking a lipid panel today.  We will also check a basic metabolic panel.    Osteopenia referred to the International osteoporosis foundation website.  Goal of 1200 mg of calcium per day.  Should be taken with food if she is using calcium carbonate to improve absorption.    We discussed screening options for colon cancer screening she would like to start with a fit test.                  COUNSELING:  Reviewed preventive health counseling, as reflected in patient instructions        She reports that she quit smoking about 33 years ago. Her smoking use included cigarettes. She has never used smokeless tobacco.      Appropriate preventive services were discussed with this patient, including applicable screening as appropriate for cardiovascular disease, diabetes, osteopenia/osteoporosis, and glaucoma.  As appropriate for age/gender, discussed screening for colorectal cancer, prostate cancer, breast cancer, and cervical cancer. Checklist reviewing preventive services available has been given to the patient.    Reviewed patients plan of care and provided an AVS. The Basic Care Plan (routine screening as documented in Health Maintenance) for Catie meets the Care Plan requirement. This Care Plan has been established and reviewed with the Patient.          Jocelyn Lebron MD  [facility]    Identified Health Risks:

## 2023-01-31 NOTE — LETTER
February 13, 2023      Catie Patrick   [address] [facility] [address]        Dear ,    We are writing to inform you of your test results.    Test results indicate you may require additional follow up, see comment below.    Your creatinine was slightly elevated.  I would like us to recheck it.  I have ordered repeat labs.  You need to schedule a lab appointment.  I would like you to schedule a telephone or video appointment with me to review the results a few days after your test is done.       Resulted Orders   Basic metabolic panel   Result Value Ref Range    Sodium 139 136 - 145 mmol/L    Potassium 4.3 3.4 - 5.3 mmol/L    Chloride 103 98 - 107 mmol/L    Carbon Dioxide (CO2) 23 22 - 29 mmol/L    Anion Gap 13 7 - 15 mmol/L    Urea Nitrogen 24.0 (H) 8.0 - 23.0 mg/dL    Creatinine 1.09 (H) 0.51 - 0.95 mg/dL    Calcium 10.0 8.8 - 10.2 mg/dL    Glucose 105 (H) 70 - 99 mg/dL    GFR Estimate 55 (L) >60 mL/min/1.73m2      Comment:      eGFR calculated using 2021 CKD-EPI equation.   Lipid panel reflex to direct LDL Fasting   Result Value Ref Range    Cholesterol 176 <200 mg/dL    Triglycerides 113 <150 mg/dL    Direct Measure HDL 66 >=50 mg/dL    LDL Cholesterol Calculated 87 <=100 mg/dL    Non HDL Cholesterol 110 <130 mg/dL    Narrative    Cholesterol  Desirable:  <200 mg/dL    Triglycerides  Normal:  Less than 150 mg/dL  Borderline High:  150-199 mg/dL  High:  200-499 mg/dL  Very High:  Greater than or equal to 500 mg/dL    Direct Measure HDL  Female:  Greater than or equal to 50 mg/dL   Male:  Greater than or equal to 40 mg/dL    LDL Cholesterol  Desirable:  <100mg/dL  Above Desirable:  100-129 mg/dL   Borderline High:  130-159 mg/dL   High:  160-189 mg/dL   Very High:  >= 190 mg/dL    Non HDL Cholesterol  Desirable:  130 mg/dL  Above Desirable:  130-159 mg/dL  Borderline High:  160-189 mg/dL  High:  190-219 mg/dL  Very High:  Greater than or equal to 220 mg/dL       If you have any questions  or concerns, please call the clinic at the number listed above.       Sincerely,      Jocelyn Lebron MD

## 2023-01-31 NOTE — PROGRESS NOTES
The patient was provided with written information regarding signs of hearing loss.  She is at risk for falling and has been provided with information to reduce the risk of falling at home.

## 2023-03-05 DIAGNOSIS — N95.1 MENOPAUSAL SYNDROME (HOT FLASHES): ICD-10-CM

## 2023-03-05 DIAGNOSIS — F33.42 RECURRENT MAJOR DEPRESSIVE DISORDER, IN FULL REMISSION (H): ICD-10-CM

## 2023-03-05 DIAGNOSIS — I10 BENIGN ESSENTIAL HYPERTENSION: Primary | ICD-10-CM

## 2023-03-07 NOTE — TELEPHONE ENCOUNTER
"  Medications listed as patient reported.  Last office visit: 1/31/2023     Future Appointments 3/7/2023 - 9/3/2023    None          Requested Prescriptions   Pending Prescriptions Disp Refills     citalopram (CELEXA) 20 MG tablet [Pharmacy Med Name: CITALOPRAM HBR 20 MG TABLET] 90 tablet 3     Sig: TAKE 1 TABLET BY MOUTH EVERY DAY       SSRIs Protocol Passed - 3/5/2023  9:31 AM        Passed - Recent (12 mo) or future (30 days) visit within the authorizing provider's specialty     Patient has had an office visit with the authorizing provider or a provider within the authorizing providers department within the previous 12 mos or has a future within next 30 days. See \"Patient Info\" tab in inbasket, or \"Choose Columns\" in Meds & Orders section of the refill encounter.              Passed - Medication is active on med list        Passed - Patient is age 18 or older        Passed - No active pregnancy on record        Passed - No positive pregnancy test in last 12 months           lisinopril (ZESTRIL) 10 MG tablet [Pharmacy Med Name: LISINOPRIL 10 MG TABLET] 90 tablet 3     Sig: TAKE 1 TABLET BY MOUTH EVERY DAY       ACE Inhibitors (Including Combos) Protocol Failed - 3/5/2023  9:31 AM        Failed - Normal serum creatinine on file in past 12 months     Recent Labs   Lab Test 01/31/23  1551   CR 1.09*       Ok to refill medication if creatinine is low          Passed - Blood pressure under 140/90 in past 12 months     BP Readings from Last 3 Encounters:   01/31/23 94/64   02/10/22 118/74   11/30/21 104/60                 Passed - Recent (12 mo) or future (30 days) visit within the authorizing provider's specialty     Patient has had an office visit with the authorizing provider or a provider within the authorizing providers department within the previous 12 mos or has a future within next 30 days. See \"Patient Info\" tab in inbasket, or \"Choose Columns\" in Meds & Orders section of the refill encounter.              " Passed - Medication is active on med list        Passed - Patient is age 18 or older        Passed - No active pregnancy on record        Passed - Normal serum potassium on file in past 12 months     Recent Labs   Lab Test 01/31/23  1551   POTASSIUM 4.3             Passed - No positive pregnancy test within past 12 months

## 2023-03-10 PROBLEM — K64.2 GRADE III HEMORRHOIDS: Status: ACTIVE | Noted: 2023-03-10

## 2023-03-10 PROBLEM — I25.10 ARTERIOSCLEROSIS OF CORONARY ARTERY: Status: ACTIVE | Noted: 2023-03-10

## 2023-03-10 PROBLEM — E78.2 MIXED HYPERLIPIDEMIA: Status: ACTIVE | Noted: 2023-03-10

## 2023-03-10 PROBLEM — R23.2 FLUSHING: Status: ACTIVE | Noted: 2023-03-10

## 2023-03-10 PROBLEM — Z87.891 FORMER SMOKER: Status: ACTIVE | Noted: 2023-03-10

## 2023-03-10 RX ORDER — LISINOPRIL 10 MG/1
TABLET ORAL
Qty: 90 TABLET | Refills: 3 | Status: SHIPPED | OUTPATIENT
Start: 2023-03-10 | End: 2024-02-01

## 2023-03-10 RX ORDER — CITALOPRAM HYDROBROMIDE 20 MG/1
TABLET ORAL
Qty: 90 TABLET | Refills: 3 | Status: SHIPPED | OUTPATIENT
Start: 2023-03-10 | End: 2024-02-01

## 2023-03-30 DIAGNOSIS — I25.10 CORONARY ARTERY DISEASE INVOLVING NATIVE CORONARY ARTERY OF NATIVE HEART WITHOUT ANGINA PECTORIS: ICD-10-CM

## 2023-03-30 RX ORDER — ATORVASTATIN CALCIUM 20 MG/1
TABLET, FILM COATED ORAL
Qty: 90 TABLET | Refills: 1 | Status: SHIPPED | OUTPATIENT
Start: 2023-03-30 | End: 2023-09-29

## 2023-03-30 NOTE — TELEPHONE ENCOUNTER
"    Last office visit: 1/31/2023      Future Appointments 3/30/2023 - 9/26/2023    None          Requested Prescriptions   Pending Prescriptions Disp Refills     atorvastatin (LIPITOR) 20 MG tablet [Pharmacy Med Name: ATORVASTATIN 20 MG TABLET] 90 tablet 0     Sig: TAKE 1 TABLET BY MOUTH EVERY DAY       Statins Protocol Passed - 3/30/2023 12:43 AM        Passed - LDL on file in past 12 months     Recent Labs   Lab Test 01/31/23  1551   LDL 87             Passed - No abnormal creatine kinase in past 12 months     No lab results found.             Passed - Recent (12 mo) or future (30 days) visit within the authorizing provider's specialty     Patient has had an office visit with the authorizing provider or a provider within the authorizing providers department within the previous 12 mos or has a future within next 30 days. See \"Patient Info\" tab in inbasket, or \"Choose Columns\" in Meds & Orders section of the refill encounter.              Passed - Medication is active on med list        Passed - Patient is age 18 or older        Passed - No active pregnancy on record        Passed - No positive pregnancy test in past 12 months                   "

## 2023-09-29 DIAGNOSIS — I25.10 CORONARY ARTERY DISEASE INVOLVING NATIVE CORONARY ARTERY OF NATIVE HEART WITHOUT ANGINA PECTORIS: ICD-10-CM

## 2023-09-29 RX ORDER — ATORVASTATIN CALCIUM 20 MG/1
TABLET, FILM COATED ORAL
Qty: 90 TABLET | Refills: 0 | Status: SHIPPED | OUTPATIENT
Start: 2023-09-29 | End: 2024-01-02

## 2023-12-30 DIAGNOSIS — I25.10 CORONARY ARTERY DISEASE INVOLVING NATIVE CORONARY ARTERY OF NATIVE HEART WITHOUT ANGINA PECTORIS: ICD-10-CM

## 2024-01-02 RX ORDER — ATORVASTATIN CALCIUM 20 MG/1
TABLET, FILM COATED ORAL
Qty: 90 TABLET | Refills: 0 | Status: SHIPPED | OUTPATIENT
Start: 2024-01-02 | End: 2024-02-01

## 2024-02-01 ENCOUNTER — OFFICE VISIT (OUTPATIENT)
Dept: FAMILY MEDICINE | Facility: CLINIC | Age: 69
End: 2024-02-01
Payer: COMMERCIAL

## 2024-02-01 VITALS
TEMPERATURE: 97.3 F | WEIGHT: 135 LBS | HEART RATE: 88 BPM | DIASTOLIC BLOOD PRESSURE: 72 MMHG | OXYGEN SATURATION: 98 % | SYSTOLIC BLOOD PRESSURE: 120 MMHG | BODY MASS INDEX: 27.21 KG/M2 | RESPIRATION RATE: 14 BRPM | HEIGHT: 59 IN

## 2024-02-01 DIAGNOSIS — F33.42 RECURRENT MAJOR DEPRESSIVE DISORDER, IN FULL REMISSION (H): ICD-10-CM

## 2024-02-01 DIAGNOSIS — Z23 NEED FOR VACCINATION: ICD-10-CM

## 2024-02-01 DIAGNOSIS — I10 BENIGN ESSENTIAL HYPERTENSION: ICD-10-CM

## 2024-02-01 DIAGNOSIS — Z12.11 SCREEN FOR COLON CANCER: ICD-10-CM

## 2024-02-01 DIAGNOSIS — I25.10 CORONARY ARTERY DISEASE INVOLVING NATIVE CORONARY ARTERY OF NATIVE HEART WITHOUT ANGINA PECTORIS: ICD-10-CM

## 2024-02-01 DIAGNOSIS — Z00.00 ENCOUNTER FOR MEDICARE ANNUAL WELLNESS EXAM: Primary | ICD-10-CM

## 2024-02-01 DIAGNOSIS — R79.89 ELEVATED SERUM CREATININE: ICD-10-CM

## 2024-02-01 DIAGNOSIS — Z00.00 HEALTH CARE MAINTENANCE: ICD-10-CM

## 2024-02-01 DIAGNOSIS — M85.852 OSTEOPENIA OF NECKS OF BOTH FEMURS: ICD-10-CM

## 2024-02-01 DIAGNOSIS — N95.1 MENOPAUSAL SYNDROME (HOT FLASHES): ICD-10-CM

## 2024-02-01 DIAGNOSIS — M85.851 OSTEOPENIA OF NECKS OF BOTH FEMURS: ICD-10-CM

## 2024-02-01 PROCEDURE — G0008 ADMIN INFLUENZA VIRUS VAC: HCPCS | Performed by: FAMILY MEDICINE

## 2024-02-01 PROCEDURE — G0439 PPPS, SUBSEQ VISIT: HCPCS | Performed by: FAMILY MEDICINE

## 2024-02-01 PROCEDURE — 99214 OFFICE O/P EST MOD 30 MIN: CPT | Mod: 25 | Performed by: FAMILY MEDICINE

## 2024-02-01 PROCEDURE — 90662 IIV NO PRSV INCREASED AG IM: CPT | Performed by: FAMILY MEDICINE

## 2024-02-01 PROCEDURE — 80061 LIPID PANEL: CPT | Performed by: FAMILY MEDICINE

## 2024-02-01 PROCEDURE — 36415 COLL VENOUS BLD VENIPUNCTURE: CPT | Performed by: FAMILY MEDICINE

## 2024-02-01 PROCEDURE — 80048 BASIC METABOLIC PNL TOTAL CA: CPT | Performed by: FAMILY MEDICINE

## 2024-02-01 RX ORDER — ATORVASTATIN CALCIUM 20 MG/1
20 TABLET, FILM COATED ORAL DAILY
Qty: 90 TABLET | Refills: 1 | Status: SHIPPED | OUTPATIENT
Start: 2024-02-01 | End: 2024-09-27

## 2024-02-01 RX ORDER — LISINOPRIL 5 MG/1
5 TABLET ORAL DAILY
Qty: 90 TABLET | Refills: 1 | Status: SHIPPED | OUTPATIENT
Start: 2024-02-01 | End: 2024-08-02

## 2024-02-01 RX ORDER — CITALOPRAM HYDROBROMIDE 20 MG/1
20 TABLET ORAL DAILY
Qty: 90 TABLET | Refills: 1 | Status: SHIPPED | OUTPATIENT
Start: 2024-02-01 | End: 2024-07-15

## 2024-02-01 SDOH — HEALTH STABILITY: PHYSICAL HEALTH: ON AVERAGE, HOW MANY DAYS PER WEEK DO YOU ENGAGE IN MODERATE TO STRENUOUS EXERCISE (LIKE A BRISK WALK)?: 3 DAYS

## 2024-02-01 ASSESSMENT — PATIENT HEALTH QUESTIONNAIRE - PHQ9
SUM OF ALL RESPONSES TO PHQ QUESTIONS 1-9: 4
SUM OF ALL RESPONSES TO PHQ QUESTIONS 1-9: 4
10. IF YOU CHECKED OFF ANY PROBLEMS, HOW DIFFICULT HAVE THESE PROBLEMS MADE IT FOR YOU TO DO YOUR WORK, TAKE CARE OF THINGS AT HOME, OR GET ALONG WITH OTHER PEOPLE: SOMEWHAT DIFFICULT

## 2024-02-01 ASSESSMENT — SOCIAL DETERMINANTS OF HEALTH (SDOH): HOW OFTEN DO YOU GET TOGETHER WITH FRIENDS OR RELATIVES?: MORE THAN THREE TIMES A WEEK

## 2024-02-01 NOTE — LETTER
My Depression Action Plan  Name: Catie Patrick   Date of Birth 1955  Date: 2/1/2024    My doctor: Jocelyn Lebron   My clinic: [address] [address]  [phone]            GREEN    ZONE   Good Control    What it looks like:   Things are going generally well. You have normal ups and downs. You may even feel depressed from time to time, but bad moods usually last less than a day.   What you need to do:  Continue to care for yourself (see self care plan)  Check your depression survival kit and update it as needed  Follow your physician s recommendations including any medication.  Do not stop taking medication unless you consult with your physician first.             YELLOW         ZONE Getting Worse    What it looks like:   Depression is starting to interfere with your life.   It may be hard to get out of bed; you may be starting to isolate yourself from others.  Symptoms of depression are starting to last most all day and this has happened for several days.   You may have suicidal thoughts but they are not constant.   What you need to do:     Call your care team. Your response to treatment will improve if you keep your care team informed of your progress. Yellow periods are signs an adjustment may need to be made.     Continue your self-care.  Just get dressed and ready for the day.  Don't give yourself time to talk yourself out of it.    Talk to someone in your support network.    Open up your Depression Self-Care Plan/Wellness Kit.             RED    ZONE Medical Alert - Get Help    What it looks like:   Depression is seriously interfering with your life.   You may experience these or other symptoms: You can t get out of bed most days, can t work or engage in other necessary activities, you have trouble taking care of basic hygiene, or basic responsibilities, thoughts of suicide or death that will not go away,  self-injurious behavior.     What you need to do:  Call your care team and request a same-day appointment. If they are not available (weekends or after hours) call your local crisis line, emergency room or 911.          Depression Self-Care Plan / Wellness Kit    Many people find that medication and therapy are helpful treatments for managing depression. In addition, making small changes to your everyday life can help to boost your mood and improve your wellbeing. Below are some tips for you to consider. Be sure to talk with your medical provider and/or behavioral health consultant if your symptoms are worsening or not improving.     Sleep   Sleep hygiene  means all of the habits that support good, restful sleep. It includes maintaining a consistent bedtime and wake time, using your bedroom only for sleeping or sex, and keeping the bedroom dark and free of distractions like a computer, smartphone, or television.     Develop a Healthy Routine  Maintain good hygiene. Get out of bed in the morning, make your bed, brush your teeth, take a shower, and get dressed. Don t spend too much time viewing media that makes you feel stressed. Find time to relax each day.    Exercise  Get some form of exercise every day. This will help reduce pain and release endorphins, the  feel good  chemicals in your brain. It can be as simple as just going for a walk or doing some gardening, anything that will get you moving.      Diet  Strive to eat healthy foods, including fruits and vegetables. Drink plenty of water. Avoid excessive sugar, caffeine, alcohol, and other mood-altering substances.     Stay Connected with Others  Stay in touch with friends and family members.    Manage Your Mood  Try deep breathing, massage therapy, biofeedback, or meditation. Take part in fun activities when you can. Try to find something to smile about each day.     Psychotherapy  Be open to working with a therapist if your provider recommends it.      Medication  Be sure to take your medication as prescribed. Most anti-depressants need to be taken every day. It usually takes several weeks for medications to work. Not all medicines work for all people. It is important to follow-up with your provider to make sure you have a treatment plan that is working for you. Do not stop your medication abruptly without first discussing it with your provider.    Crisis Resources   These hotlines are for both adults and children. They and are open 24 hours a day, 7 days a week unless noted otherwise.    National Suicide Prevention Lifeline   988 or 5-821-645-SYFK (7187)    Crisis Text Line    www.crisistextline.org  Text HOME to 349956 from anywhere in the [place], anytime, about any type of crisis. A live, trained crisis counselor will receive the text and respond quickly.    [facility] Lifeline for LGBTQ Youth  A national crisis intervention and suicide lifeline for LGBTQ youth under 25. Provides a safe place to talk without judgement. Call 1-618.927.7232; text START to 692524 or visit www.thetrevorproject.org to talk to a trained counselor.    For [facility] crisis numbers, visit the [facility] website at:  https://[place].gov/dhs/people-we-serve/adults/health-care/mental-health/resources/crisis-contacts.jsp

## 2024-02-01 NOTE — PROGRESS NOTES
Preventive Care Visit  [facility]  Jocelyn Lebron MD, Family Medicine  2024      SUBJECTIVE:   Catie is a 68 year old, presenting for the following:  Medicare Visit        2024     2:18 PM   Additional Questions   Roomed by Tiffany MEIER     Are you in the first 12 months of your Medicare coverage?  No    HPI  Today's PHQ-9 Score:       2024     2:04 PM   PHQ-9 SCORE   PHQ-9 Total Score MyChart 4 (Minimal depression)   PHQ-9 Total Score 4         Have you ever done Advance Care Planning? (For example, a Health Directive, POLST, or a discussion with a medical provider or your loved ones about your wishes): Yes, advance care planning is on file.  Hearing test declined    Fall risk  Fallen 2 or more times in the past year?: No  Any fall with injury in the past year?: No    Cognitive Screening   1) Repeat 3 items   2) Clock draw: NORMAL   3) 3 item recall: Recalls 3 objects      Mini-CogTM Copyright MERARY Martinez. Licensed by the author for use in [facility]; reprinted with permission (soob@[facility]). All rights reserved.      Do you have sleep apnea, excessive snoring or daytime drowsiness? : no    Reviewed and updated as needed this visit by clinical staff   Tobacco  Allergies  Meds              Reviewed and updated as needed this visit by Provider                  Social History     Tobacco Use    Smoking status: Former     Packs/day: .5     Types: Cigarettes     Quit date: 1989     Years since quittin.7     Passive exposure: Past    Smokeless tobacco: Never   Substance Use Topics    Alcohol use: Not on file             2024     2:10 PM   Alcohol Use   Prescreen: >3 drinks/day or >7 drinks/week? Not Applicable     Do you have a current opioid prescription? No  Do you use any other controlled substances or medications that are not prescribed by a provider? None              Current providers sharing in care for this patient include:   Patient Care  "Team:  Jocelyn Lebron MD as PCP - General (Family Medicine)  Jocelyn Lebron MD as Assigned PCP    The following health maintenance items are reviewed in Epic and correct as of today:  Health Maintenance   Topic Date Due    HEPATITIS C SCREENING  Never done    RSV VACCINE (Pregnancy & 60+) (1 - 1-dose 60+ series) Never done    COLORECTAL CANCER SCREENING  01/16/2021    INFLUENZA VACCINE (1) 09/01/2023    COVID-19 Vaccine (4 - 2023-24 season) 09/01/2023    PHQ-9  08/01/2024    DTAP/TDAP/TD IMMUNIZATION (5 - Td or Tdap) 08/18/2024    MEDICARE ANNUAL WELLNESS VISIT  02/01/2025    ANNUAL REVIEW OF HM ORDERS  02/01/2025    FALL RISK ASSESSMENT  02/01/2025    MAMMO SCREENING  12/14/2025    GLUCOSE  01/31/2026    LIPID  01/31/2028    ADVANCE CARE PLANNING  02/01/2029    DEXA  12/30/2036    DEPRESSION ACTION PLAN  Completed    Pneumococcal Vaccine: 65+ Years  Completed    ZOSTER IMMUNIZATION  Completed    IPV IMMUNIZATION  Aged Out    HPV IMMUNIZATION  Aged Out    MENINGITIS IMMUNIZATION  Aged Out    RSV MONOCLONAL ANTIBODY  Aged Out             Review of Systems   Neg except as per HPI    OBJECTIVE:   /72   Pulse 88   Temp 97.3  F (36.3  C)   Resp 14   Ht 1.499 m (4' 11\")   Wt 61.2 kg (135 lb)   LMP  (LMP Unknown)   SpO2 98%   BMI 27.27 kg/m     Estimated body mass index is 27.27 kg/m  as calculated from the following:    Height as of this encounter: 1.499 m (4' 11\").    Weight as of this encounter: 61.2 kg (135 lb).  Physical Exam    General: alert and oriented ×3 no acute distress.    HEENT: pupils are equal round and reactive to light extraocular motion is intact. Normocephalic and atraumatic.     Hearing is grossly normal and there is no otorrhea.     Chest: has bilateral rise with no increased work of breathing. ctab    Cardiovascular: normal perfusion and brisk capillary refill. s1s2    Musculoskeletal: no gross focal abnormalities and normal gait.    Neuro: no gross focal abnormalities and " memory seems intact.    Psychiatric: speech is clear and coherent and fluent. Patient dressed appropriately for the weather. Mood is appropriate and affect is full.                ASSESSMENT / PLAN:       ICD-10-CM    1. Encounter for Medicare annual wellness exam  Z00.00       2. Screen for colon cancer  Z12.11 Fecal colorectal cancer screen (FIT)      3. Health care maintenance  Z00.00 REVIEW OF HEALTH MAINTENANCE PROTOCOL ORDERS      4. Need for vaccination  Z23       5. Osteopenia of necks of both femurs  M85.851 DX Hip/Pelvis/Spine    M85.852       6. Elevated serum creatinine  R79.89 Basic metabolic panel      7. Benign essential hypertension  I10 lisinopril (ZESTRIL) 5 MG tablet      8. Menopausal syndrome (hot flashes)  N95.1 citalopram (CELEXA) 20 MG tablet      9. Coronary artery disease involving native coronary artery of native heart without angina pectoris  I25.10 Lipid panel reflex to direct LDL Fasting     atorvastatin (LIPITOR) 20 MG tablet      10. Recurrent major depressive disorder, in full remission (H24)  F33.42 citalopram (CELEXA) 20 MG tablet        Osteopenia patient is a candidate for medical treatment.  We discussed risks benefits alternatives of treatment.  For now she would like to defer treatment and continue with therapeutic lifestyle changes.  She is getting adequate calcium intake, she is doing weightbearing exercises, supplementing with vitamin D.  Will need repeat bone mineral density study in 2025.    Hot flashes well-controlled with citalopram.  Previously had an elevated creatinine on chart review.  Will get a basic metabolic panel today.  Due for colon cancer screening patient would like to do a fit test.      Hypertension well-controlled on lisinopril 5 mg daily.  Will switch her from half of a 10 to a 5 mg tablet for ease.     Hyperlipidemia check lipid panel and renew atorvastatin based on results                2/1/2024     2:04 PM   PHQ   PHQ-9 Total Score 4   Q9: Thoughts  of better off dead/self-harm past 2 weeks Not at all              Counseling  Reviewed preventive health counseling, as reflected in patient instructions        She reports that she quit smoking about 34 years ago. Her smoking use included cigarettes. She smoked an average of 0.5 packs per day. She has been exposed to tobacco smoke. She has never used smokeless tobacco.      Appropriate preventive services were discussed with this patient, including applicable screening as appropriate for fall prevention, nutrition, physical activity, Tobacco-use cessation, weight loss and cognition.  Checklist reviewing preventive services available has been given to the patient.    Reviewed patients plan of care and provided an AVS. The Basic Care Plan (routine screening as documented in Health Maintenance) for Catie meets the Care Plan requirement. This Care Plan has been established and reviewed with the Patient.        Signed Electronically by: Jocelyn Lebron MD    Identified Health Risks  I have reviewed Opioid Use Disorder and Substance Use Disorder risk factors and made any needed referrals.   Answers submitted by the patient for this visit:  Patient Health Questionnaire (Submitted on 2/1/2024)  If you checked off any problems, how difficult have these problems made it for you to do your work, take care of things at home, or get along with other people?: Somewhat difficult  PHQ9 TOTAL SCORE: 4

## 2024-02-01 NOTE — PATIENT INSTRUCTIONS
"Eating Healthy Foods: Care Instructions  With every meal, you can make healthy food choices. Try to eat a variety of fruits, vegetables, whole grains, lean proteins, and low-fat dairy products. This can help you get the right balance of nutrients, including vitamins and minerals. Small changes add up over time. You can start by adding one healthy food to your meals each day.    Try to make half your plate fruits and vegetables, one-fourth whole grains, and one-fourth lean proteins. Try including dairy with your meals.   Eat more fruits and vegetables. Try to have them with most meals and snacks.   Foods for healthy eating    Fruits    These can be fresh, frozen, canned, or dried.  Try to choose whole fruit rather than fruit juice.  Eat a variety of colors.    Vegetables    These can be fresh, frozen, canned, or dried.  Beans, peas, and lentils count too.    Whole grains    Choose whole-grain breads, cereals, and noodles.  Try brown rice.    Lean proteins    These can include lean meat, poultry, fish, and eggs.  You can also have tofu, beans, peas, lentils, nuts, and seeds.    Dairy    Try milk, yogurt, and cheese.  Choose low-fat or fat-free when you can.  If you need to, use lactose-free milk or fortified plant-based milk products, such as soy milk.    Water    Drink water when you're thirsty.  Limit sugar-sweetened drinks, including soda, fruit drinks, and sports drinks.  Where can you learn more?  Go to https://www.[employer].net/patiented  Enter T756 in the search box to learn more about \"Eating Healthy Foods: Care Instructions.\"  Current as of: February 28, 2023               Content Version: 13.8    8588-7400 [employer].   Care instructions adapted under license by your healthcare professional. If you have questions about a medical condition or this instruction, always ask your healthcare professional. [employer] disclaims any warranty or liability for your use of this " information.      Nutrition for Older Adults: Care Instructions  Overview     Good nutrition is important at any age. But it is especially important for older adults. Eating a healthy diet helps keep your body strong. And it can help lower your risk for disease.  As you get older, your body needs more of certain nutrients. These include vitamin B12, calcium, and vitamin D. But it may be harder for you to get these and other important nutrients. This could be for many reasons. You may not feel as hungry as you used to. Or you could have problems with your teeth or mouth that make it hard to chew. Or you may not enjoy planning and preparing meals, especially if you live alone.  Talk with your doctor if you want help getting the most nutrition from what you eat. The doctor may have you work with a dietitian to help you plan meals.  Follow-up care is a key part of your treatment and safety. Be sure to make and go to all appointments, and call your doctor if you are having problems. It's also a good idea to know your test results and keep a list of the medicines you take.  How can you care for yourself at home?  To stay healthy  Eat a variety of foods. The more you vary the foods you eat, the more vitamins, minerals, and other nutrients you get.  Take a multivitamin every day. Choose one with about 100% of the daily value (DV) for vitamins and minerals. Do not take more than 100% of the daily value for any vitamin or mineral unless your doctor tells you to. Talk with your doctor if you are not sure which multivitamin is right for you.  Eat lots of fruits and vegetables. Fresh, frozen, or no-salt canned vegetables and fruits in their own juice or light syrup are good choices.  Include foods that are high in vitamin B12 in your diet. Good choices are fortified breakfast cereal, nonfat or low-fat milk and other dairy products, meat, poultry, fish, and eggs.  Get enough calcium and vitamin D. Good choices include nonfat or  low-fat milk, cheese, and yogurt. Other good options are tofu, orange juice with added calcium, and some leafy green vegetables, such as steffany greens and kale. If you don't use milk products, talk to your doctor about calcium and vitamin D supplements.  Eat protein foods every day. Good choices include lean meat, fish, poultry, eggs, and cheese. Other good options are cooked beans, peanut butter, and nuts and seeds.  Choose whole grains for half of the grains you eat. Look for 100% whole wheat bread, whole-grain cereals, brown rice, and other whole grains.  If you have constipation  Eat high-fiber foods every day. These include fruits, vegetables, cooked dried beans, and whole grains.  Drink plenty of fluids. If you have kidney, heart, or liver disease and have to limit fluids, talk with your doctor before you increase the amount of fluids you drink.  Ask your doctor if stool softeners may help keep your bowels regular.  If you have mouth problems that make chewing hard  Pick canned or cooked fruits and vegetables. These are often softer.  Chop or shred meat, poultry, and fish. Add sauce or gravy to the meat to help keep it moist.  Pick other protein foods that are soft. These include cheese, peanut butter, cooked beans, cottage cheese, and eggs.  If you have trouble shopping for yourself  Ask a local food store to deliver groceries to your home.  Contact a volunteer center and ask for help.  Ask a family member or neighbor to help you.  If you have trouble preparing meals  If you are able, take a cooking class.  Use a microwave oven to cook TV dinners and other frozen or prepared foods.  Take part in group meal programs. You can find these through senior citizen programs.  Have meals brought to your home. Your community may offer programs that deliver meals, such as Meals on Wheels.  If your appetite is poor  Try eating smaller amounts of food more often. For example, eat 4 or 5 small meals a day instead of 1 or  "2 large meals.  Eat with family and friends. Or take part in group meal programs offered through volunteer programs. Eating with others may help your appetite. And it helps you be more social.  Ask your doctor if your medicines could cause appetite or taste problems. If so, ask about changing medicines.  Add spices and herbs to increase the flavor of food.  If you think you are depressed, ask your doctor for help. Depression can affect your appetite. And it can make it hard to do everyday activities like grocery shopping and making meals. Treatment can help.  When should you call for help?  Watch closely for changes in your health, and be sure to contact your doctor if you have any problems.  Where can you learn more?  Go to https://www.[employer].net/patiented  Enter L643 in the search box to learn more about \"Nutrition for Older Adults: Care Instructions.\"  Current as of: February 26, 2023               Content Version: 13.8    7264-0308 [employer].   Care instructions adapted under license by your healthcare professional. If you have questions about a medical condition or this instruction, always ask your healthcare professional. [employer] disclaims any warranty or liability for your use of this information.      Learning About Activities of Daily Living  What are activities of daily living?     Activities of daily living (ADLs) are the basic self-care tasks you do every day. As you age, and if you have health problems, you may find that it's harder to do these things for yourself. That's when you may need some help.  Your doctor uses ADLs to measure how much help you need. Knowing what you can and can't do for yourself is an important first step to getting help. And when you have the help you need, you can stay as independent as possible.  Your doctor will want to know if you are able to do tasks such as:  Take a bath or shower without help.  Go to the bathroom by yourself.  Dress " and undress without help.  Shave, comb your hair, and brush teeth on your own.  Get in and out of bed or a chair without help.  Feed yourself without help.  If you are having trouble doing basic self-care tasks, talk with your doctor. You may want to bring a caregiver or family member who can help the doctor understand your needs and abilities.  How will a doctor assess your ADLs?  Asking about ADLs is part of a routine health checkup your doctor will likely do as you age. Your health check might be done in a doctor's office, in your home, or at a hospital. The goal is to find out if you are having any problems that could make your health problems worse or that make it unsafe for you to be on your own.  To measure your ADLs, your doctor will ask how hard it is for you to do routine tasks. He or she may also want to know if you have changed the way you do a task because of a health problem. He or she may watch how you:  Walk back and forth.  Keep your balance while you stand or walk.  Move from sitting to standing or from a bed to a chair.  Button or unbutton a shirt or sweater.  Remove and put on your shoes.  It's normal to feel a little worried or anxious if you find you can't do all the things you used to be able to do. Talking with your doctor about ADLs isn't a test that you either pass or fail. It's just a way to get more information about your health and safety.  Follow-up care is a key part of your treatment and safety. Be sure to make and go to all appointments, and call your doctor if you are having problems. It's also a good idea to know your test results and keep a list of the medicines you take.  Current as of: February 26, 2023               Content Version: 13.8    3799-8375 [employer].   Care instructions adapted under license by your healthcare professional. If you have questions about a medical condition or this instruction, always ask your healthcare professional. [employer] disclaims any warranty or liability for your use of this information.      Hearing Loss: Care Instructions  Overview     Hearing loss is a sudden or slow decrease in how well you hear. It can range from slight to profound. Permanent hearing loss can occur with aging. It also can happen when you are exposed long-term to loud noise. Examples include listening to loud music, riding motorcycles, or being around other loud machines.  Hearing loss can affect your work and home life. It can make you feel lonely or depressed. You may feel that you have lost your independence. But hearing aids and other devices can help you hear better and feel connected to others.  Follow-up care is a key part of your treatment and safety. Be sure to make and go to all appointments, and call your doctor if you are having problems. It's also a good idea to know your test results and keep a list of the medicines you take.  How can you care for yourself at home?  Avoid loud noises whenever possible. This helps keep your hearing from getting worse.  Always wear hearing protection around loud noises.  Wear a hearing aid as directed.  A professional can help you pick a hearing aid that will work best for you.  You can also get hearing aids over the counter for mild to moderate hearing loss.  Have hearing tests as your doctor suggests. They can show whether your hearing has changed. Your hearing aid may need to be adjusted.  Use other devices as needed. These may include:  Telephone amplifiers and hearing aids that can connect to a television, stereo, radio, or microphone.  Devices that use lights or vibrations. These alert you to the doorbell, a ringing telephone, or a baby monitor.  Television closed-captioning. This shows the words at the bottom of the screen. Most new TVs can do this.  TTY (text telephone). This lets you type messages back and forth on the telephone instead of talking or listening. These devices are also called  "TDD. When messages are typed on the keyboard, they are sent over the phone line to a receiving TTY. The message is shown on a monitor.  Use text messaging, social media, and email if it is hard for you to communicate by telephone.  Try to learn a listening technique called speechreading. It is not lipreading. You pay attention to people's gestures, expressions, posture, and tone of voice. These clues can help you understand what a person is saying. Face the person you are talking to, and have them face you. Make sure the lighting is good. You need to see the other person's face clearly.  Think about counseling if you need help to adjust to your hearing loss.  When should you call for help?  Watch closely for changes in your health, and be sure to contact your doctor if:    You think your hearing is getting worse.     You have new symptoms, such as dizziness or nausea.   Where can you learn more?  Go to https://www.[employer]/patiented  Enter R798 in the search box to learn more about \"Hearing Loss: Care Instructions.\"  Current as of: February 28, 2023               Content Version: 13.8    2315-3571 [employer].   Care instructions adapted under license by your healthcare professional. If you have questions about a medical condition or this instruction, always ask your healthcare professional. [employer] disclaims any warranty or liability for your use of this information.      Learning About Stress  What is stress?     Stress is your body's response to a hard situation. Your body can have a physical, emotional, or mental response. Stress is a fact of life for most people, and it affects everyone differently. What causes stress for you may not be stressful for someone else.  A lot of things can cause stress. You may feel stress when you go on a job interview, take a test, or run a race. This kind of short-term stress is normal and even useful. It can help you if you need to work hard " or react quickly. For example, stress can help you finish an important job on time.  Long-term stress is caused by ongoing stressful situations or events. Examples of long-term stress include long-term health problems, ongoing problems at work, or conflicts in your family. Long-term stress can harm your health.  How does stress affect your health?  When you are stressed, your body responds as though you are in danger. It makes hormones that speed up your heart, make you breathe faster, and give you a burst of energy. This is called the fight-or-flight stress response. If the stress is over quickly, your body goes back to normal and no harm is done.  But if stress happens too often or lasts too long, it can have bad effects. Long-term stress can make you more likely to get sick, and it can make symptoms of some diseases worse. If you tense up when you are stressed, you may develop neck, shoulder, or low back pain. Stress is linked to high blood pressure and heart disease.  Stress also harms your emotional health. It can make you quarles, tense, or depressed. Your relationships may suffer, and you may not do well at work or school.  What can you do to manage stress?  You can try these things to help manage stress:   Do something active. Exercise or activity can help reduce stress. Walking is a great way to get started. Even everyday activities such as housecleaning or yard work can help.  Try yoga or leif chi. These techniques combine exercise and meditation. You may need some training at first to learn them.  Do something you enjoy. For example, listen to music or go to a movie. Practice your hobby or do volunteer work.  Meditate. This can help you relax, because you are not worrying about what happened before or what may happen in the future.  Do guided imagery. Imagine yourself in any setting that helps you feel calm. You can use online videos, books, or a teacher to guide you.  Do breathing exercises. For  "example:  From a standing position, bend forward from the waist with your knees slightly bent. Let your arms dangle close to the floor.  Breathe in slowly and deeply as you return to a standing position. Roll up slowly and lift your head last.  Hold your breath for just a few seconds in the standing position.  Breathe out slowly and bend forward from the waist.  Let your feelings out. Talk, laugh, cry, and express anger when you need to. Talking with supportive friends or family, a counselor, or a destiney leader about your feelings is a healthy way to relieve stress. Avoid discussing your feelings with people who make you feel worse.  Write. It may help to write about things that are bothering you. This helps you find out how much stress you feel and what is causing it. When you know this, you can find better ways to cope.  What can you do to prevent stress?  You might try some of these things to help prevent stress:  Manage your time. This helps you find time to do the things you want and need to do.  Get enough sleep. Your body recovers from the stresses of the day while you are sleeping.  Get support. Your family, friends, and community can make a difference in how you experience stress.  Limit your news feed. Avoid or limit time on social media or news that may make you feel stressed.  Do something active. Exercise or activity can help reduce stress. Walking is a great way to get started.  Where can you learn more?  Go to https://www.[employer].net/patiented  Enter N032 in the search box to learn more about \"Learning About Stress.\"  Current as of: February 26, 2023               Content Version: 13.8    4624-0018 [employer].   Care instructions adapted under license by your healthcare professional. If you have questions about a medical condition or this instruction, always ask your healthcare professional. [employer] disclaims any warranty or liability for your use of this " information.      Preventive Care Advice   This is general advice given by our system to help you stay healthy. However, your care team may have specific advice just for you. Please talk to your care team about your preventive care needs.  Nutrition  Eat 5 or more servings of fruits and vegetables each day.  Try wheat bread, brown rice and whole grain pasta (instead of white bread, rice, and pasta).  Get enough calcium and vitamin D. Check the label on foods and aim for 100% of the RDA (recommended daily allowance).  Lifestyle  Exercise at least 150 minutes each week  (30 minutes a day, 5 days a week).  Do muscle strengthening activities 2 days a week. These help control your weight and prevent disease.  No smoking.  Wear sunscreen to prevent skin cancer.  Have a dental exam and cleaning every 6 months.  Yearly exams  See your health care team every year to talk about:  Any changes in your health.  Any medicines your care team has prescribed.  Preventive care, family planning, and ways to prevent chronic diseases.  Shots (vaccines)   HPV shots (up to age 26), if you've never had them before.  Hepatitis B shots (up to age 59), if you've never had them before.  COVID-19 shot: Get this shot when it's due.  Flu shot: Get a flu shot every year.  Tetanus shot: Get a tetanus shot every 10 years.  Pneumococcal, hepatitis A, and RSV shots: Ask your care team if you need these based on your risk.  Shingles shot (for age 50 and up)  General health tests  Diabetes screening:  Starting at age 35, Get screened for diabetes at least every 3 years.  If you are younger than age 35, ask your care team if you should be screened for diabetes.  Cholesterol test: At age 39, start having a cholesterol test every 5 years, or more often if advised.  Bone density scan (DEXA): At age 50, ask your care team if you should have this scan for osteoporosis (brittle bones).  Hepatitis C: Get tested at least once in your life.  STIs (sexually  transmitted infections)  Before age 24: Ask your care team if you should be screened for STIs.  After age 24: Get screened for STIs if you're at risk. You are at risk for STIs (including HIV) if:  You are sexually active with more than one person.  You don't use condoms every time.  You or a partner was diagnosed with a sexually transmitted infection.  If you are at risk for HIV, ask about PrEP medicine to prevent HIV.  Get tested for HIV at least once in your life, whether you are at risk for HIV or not.  Cancer screening tests  Cervical cancer screening: If you have a cervix, begin getting regular cervical cancer screening tests starting at age 21.  Breast cancer scan (mammogram): If you've ever had breasts, begin having regular mammograms starting at age 40. This is a scan to check for breast cancer.  Colon cancer screening: It is important to start screening for colon cancer at age 45.  Have a colonoscopy test every 10 years (or more often if you're at risk) Or, ask your provider about stool tests like a FIT test every year or Cologuard test every 3 years.  To learn more about your testing options, visit:   [email or web address].  For help making a decision, visit:   https://bit.ly/lp45300.  Prostate cancer screening test: If you have a prostate, ask your care team if a prostate cancer screening test (PSA) at age 55 is right for you.  Lung cancer screening: If you are a current or former smoker ages 50 to 80, ask your care team if ongoing lung cancer screenings are right for you.  For informational purposes only. Not to replace the advice of your health care provider. Copyright   2023 [employer]. All rights reserved. Clinically reviewed by the [facility] Transitions Program. [employer] [id number] - REV 01/24.

## 2024-02-02 LAB
ANION GAP SERPL CALCULATED.3IONS-SCNC: 9 MMOL/L (ref 7–15)
BUN SERPL-MCNC: 21 MG/DL (ref 8–23)
CALCIUM SERPL-MCNC: 9.7 MG/DL (ref 8.8–10.2)
CHLORIDE SERPL-SCNC: 102 MMOL/L (ref 98–107)
CHOLEST SERPL-MCNC: 162 MG/DL
CREAT SERPL-MCNC: 0.88 MG/DL (ref 0.51–0.95)
DEPRECATED HCO3 PLAS-SCNC: 26 MMOL/L (ref 22–29)
EGFRCR SERPLBLD CKD-EPI 2021: 70 ML/MIN/1.73M2
FASTING STATUS PATIENT QL REPORTED: NO
GLUCOSE SERPL-MCNC: 96 MG/DL (ref 70–99)
HDLC SERPL-MCNC: 67 MG/DL
LDLC SERPL CALC-MCNC: 78 MG/DL
NONHDLC SERPL-MCNC: 95 MG/DL
POTASSIUM SERPL-SCNC: 4.2 MMOL/L (ref 3.4–5.3)
SODIUM SERPL-SCNC: 137 MMOL/L (ref 135–145)
TRIGL SERPL-MCNC: 87 MG/DL

## 2024-02-07 ENCOUNTER — TELEPHONE (OUTPATIENT)
Dept: FAMILY MEDICINE | Facility: CLINIC | Age: 69
End: 2024-02-07
Payer: COMMERCIAL

## 2024-02-07 NOTE — TELEPHONE ENCOUNTER
----- Message from Jocelyn Lebron MD sent at 2/5/2024  6:48 PM CST -----  Please let patient know that her basic metabolic panel looks good.  Cholesterol also seems to be well-controlled so continue the atorvastatin at 20 mg daily.

## 2024-02-08 DIAGNOSIS — I10 BENIGN ESSENTIAL HYPERTENSION: ICD-10-CM

## 2024-02-08 DIAGNOSIS — I25.10 CORONARY ARTERY DISEASE INVOLVING NATIVE CORONARY ARTERY OF NATIVE HEART WITHOUT ANGINA PECTORIS: ICD-10-CM

## 2024-02-12 RX ORDER — ASPIRIN 81 MG/1
81 TABLET, COATED ORAL DAILY
Qty: 90 TABLET | Refills: 2 | Status: SHIPPED | OUTPATIENT
Start: 2024-02-12

## 2024-02-19 ENCOUNTER — TELEPHONE (OUTPATIENT)
Dept: FAMILY MEDICINE | Facility: CLINIC | Age: 69
End: 2024-02-19
Payer: COMMERCIAL

## 2024-02-19 NOTE — CONFIDENTIAL NOTE
Please send patient the following letter    Your bone mineral density study shows that you continue to have osteopenia.  You have had a 2.6% increase in the bone mineral density in your lumbar spine.  Unfortunately, you have had a 2.2% decrease in the bone mineral density in your bilateral hips.  At this time you should continue to take 1200 mg of calcium per day.  This can be through diet or through supplements.  Please keep in mind that you can absorb no more than 500 mg at any 1 time, so you need to have little bits all day long.  You should also continue to practice weightbearing exercises and get adequate vitamin D.  We should repeat your bone mineral density study in 2 years.  You are welcome to schedule an appointment if you would like to discuss these results in further detail.

## 2024-02-19 NOTE — LETTER
February 19, 2024      Catie Patrick   [address] [facility] [id number]        Dear ,    We are writing to inform you of your test results.    Your bone mineral density study shows that you continue to have osteopenia. You have had a 2.6% increase in the bone mineral density in your lumbar spine. Unfortunately, you have had a 2.2% decrease in the bone mineral density in your bilateral hips. At this time you should continue to take 1200 mg of calcium per day. This can be through diet or through supplements. Please keep in mind that you can absorb no more than 500 mg at any 1 time, so you need to have little bits all day long. You should also continue to practice weightbearing exercises and get adequate vitamin D. We should repeat your bone mineral density study in 2 years.     You are welcome to schedule an appointment if you would like to discuss these results in further detail.       Sincerely,              Jocelyn Lebron MD

## 2024-07-12 DIAGNOSIS — N95.1 MENOPAUSAL SYNDROME (HOT FLASHES): ICD-10-CM

## 2024-07-12 DIAGNOSIS — F33.42 RECURRENT MAJOR DEPRESSIVE DISORDER, IN FULL REMISSION (H): ICD-10-CM

## 2024-07-15 RX ORDER — CITALOPRAM HYDROBROMIDE 20 MG/1
20 TABLET ORAL DAILY
Qty: 90 TABLET | Refills: 1 | Status: SHIPPED | OUTPATIENT
Start: 2024-07-15

## 2024-08-02 DIAGNOSIS — I10 BENIGN ESSENTIAL HYPERTENSION: ICD-10-CM

## 2024-08-02 RX ORDER — LISINOPRIL 5 MG/1
5 TABLET ORAL DAILY
Qty: 90 TABLET | Refills: 1 | Status: SHIPPED | OUTPATIENT
Start: 2024-08-02

## 2024-09-27 DIAGNOSIS — I25.10 CORONARY ARTERY DISEASE INVOLVING NATIVE CORONARY ARTERY OF NATIVE HEART WITHOUT ANGINA PECTORIS: ICD-10-CM

## 2024-09-27 RX ORDER — ATORVASTATIN CALCIUM 20 MG/1
20 TABLET, FILM COATED ORAL DAILY
Qty: 90 TABLET | Refills: 1 | Status: SHIPPED | OUTPATIENT
Start: 2024-09-27

## 2024-11-18 DIAGNOSIS — I10 BENIGN ESSENTIAL HYPERTENSION: ICD-10-CM

## 2024-11-18 DIAGNOSIS — I25.10 CORONARY ARTERY DISEASE INVOLVING NATIVE CORONARY ARTERY OF NATIVE HEART WITHOUT ANGINA PECTORIS: ICD-10-CM

## 2024-11-18 RX ORDER — ASPIRIN 81 MG/1
81 TABLET, COATED ORAL DAILY
Qty: 90 TABLET | Refills: 0 | Status: SHIPPED | OUTPATIENT
Start: 2024-11-18

## 2024-11-19 DIAGNOSIS — I10 BENIGN ESSENTIAL HYPERTENSION: ICD-10-CM

## 2024-11-19 RX ORDER — LISINOPRIL 5 MG/1
5 TABLET ORAL DAILY
Qty: 90 TABLET | Refills: 0 | Status: SHIPPED | OUTPATIENT
Start: 2024-11-19

## 2025-08-14 DIAGNOSIS — I25.10 CORONARY ARTERY DISEASE INVOLVING NATIVE CORONARY ARTERY OF NATIVE HEART WITHOUT ANGINA PECTORIS: ICD-10-CM

## 2025-08-14 DIAGNOSIS — N95.1 MENOPAUSAL SYNDROME (HOT FLASHES): ICD-10-CM

## 2025-08-14 DIAGNOSIS — I10 BENIGN ESSENTIAL HYPERTENSION: ICD-10-CM

## 2025-08-14 DIAGNOSIS — F33.42 RECURRENT MAJOR DEPRESSIVE DISORDER, IN FULL REMISSION: ICD-10-CM

## 2025-08-14 RX ORDER — LISINOPRIL 5 MG/1
5 TABLET ORAL DAILY
Qty: 90 TABLET | Refills: 0 | Status: SHIPPED | OUTPATIENT
Start: 2025-08-14

## 2025-08-14 RX ORDER — ASPIRIN 81 MG/1
81 TABLET, COATED ORAL DAILY
Qty: 90 TABLET | Refills: 0 | Status: SHIPPED | OUTPATIENT
Start: 2025-08-14

## 2025-08-14 RX ORDER — ATORVASTATIN CALCIUM 20 MG/1
20 TABLET, FILM COATED ORAL DAILY
Qty: 90 TABLET | Refills: 0 | Status: SHIPPED | OUTPATIENT
Start: 2025-08-14

## 2025-08-14 RX ORDER — CITALOPRAM HYDROBROMIDE 20 MG/1
20 TABLET ORAL DAILY
Qty: 90 TABLET | Refills: 0 | Status: SHIPPED | OUTPATIENT
Start: 2025-08-14